# Patient Record
Sex: FEMALE | Race: WHITE | Employment: UNEMPLOYED | URBAN - METROPOLITAN AREA
[De-identification: names, ages, dates, MRNs, and addresses within clinical notes are randomized per-mention and may not be internally consistent; named-entity substitution may affect disease eponyms.]

---

## 2017-09-08 PROCEDURE — 81001 URINALYSIS AUTO W/SCOPE: CPT | Performed by: FAMILY MEDICINE

## 2017-09-08 PROCEDURE — 82607 VITAMIN B-12: CPT | Performed by: FAMILY MEDICINE

## 2017-09-08 PROCEDURE — 82746 ASSAY OF FOLIC ACID SERUM: CPT | Performed by: FAMILY MEDICINE

## 2017-09-21 PROCEDURE — 88175 CYTOPATH C/V AUTO FLUID REDO: CPT | Performed by: FAMILY MEDICINE

## 2018-06-18 PROCEDURE — 81001 URINALYSIS AUTO W/SCOPE: CPT | Performed by: FAMILY MEDICINE

## 2018-06-18 PROCEDURE — 82607 VITAMIN B-12: CPT | Performed by: FAMILY MEDICINE

## 2018-10-04 PROCEDURE — 81001 URINALYSIS AUTO W/SCOPE: CPT | Performed by: FAMILY MEDICINE

## 2018-10-04 PROCEDURE — 87086 URINE CULTURE/COLONY COUNT: CPT | Performed by: FAMILY MEDICINE

## 2018-11-14 PROCEDURE — 87086 URINE CULTURE/COLONY COUNT: CPT | Performed by: FAMILY MEDICINE

## 2018-11-14 PROCEDURE — 81001 URINALYSIS AUTO W/SCOPE: CPT | Performed by: FAMILY MEDICINE

## 2019-02-25 PROCEDURE — 83883 ASSAY NEPHELOMETRY NOT SPEC: CPT | Performed by: INTERNAL MEDICINE

## 2019-02-25 PROCEDURE — 86334 IMMUNOFIX E-PHORESIS SERUM: CPT | Performed by: INTERNAL MEDICINE

## 2019-02-25 PROCEDURE — 86200 CCP ANTIBODY: CPT | Performed by: INTERNAL MEDICINE

## 2019-02-25 PROCEDURE — 86431 RHEUMATOID FACTOR QUANT: CPT | Performed by: INTERNAL MEDICINE

## 2019-02-25 PROCEDURE — 86235 NUCLEAR ANTIGEN ANTIBODY: CPT | Performed by: INTERNAL MEDICINE

## 2019-02-25 PROCEDURE — 86038 ANTINUCLEAR ANTIBODIES: CPT | Performed by: INTERNAL MEDICINE

## 2019-02-25 PROCEDURE — 84165 PROTEIN E-PHORESIS SERUM: CPT | Performed by: INTERNAL MEDICINE

## 2019-03-26 NOTE — H&P (VIEW-ONLY)
[facility] New Patient Visit    Sameera Bray  [id number]  7/17/1960    Sylvie Plush    Chief Complaint and History of Present Illness:     Patient presents with:  Consult  Colonoscopy Screening: egd  Abdominal Pain    59yo F ID of abscesses        Current Outpatient Medications:  PANTOPRAZOLE SODIUM 40 MG Oral Tab EC TAKE 1 TABLET(40 MG) BY MOUTH DAILY BEFORE A MEAL Disp: 90 tablet Rfl: 0   Dicyclomine HCl 10 MG Oral Cap Take 1 capsule (10 mg total) by mouth 3 (three) times da no guarding/rebound  EXTREMITIES: no c/c/e  PSYCH: normal affect  NUT: well nourished appearing, no cachexia  Neuro: A&Ox3       LABS and STUDIES:     Lab Results   Component Value Date/Time    WBC 7.62 03/04/2019 06:16 PM    HGB 13.0 03/04/2019 06:16 PM GI ENDOSCOPY - REFERRAL  -     COLONOSCOPY DIAGNOSTIC - REFERRAL    Rectal bleeding  -     COLONOSCOPY DIAGNOSTIC - REFERRAL    Family history of colon cancer  -     COLONOSCOPY DIAGNOSTIC - REFERRAL    Dysphagia, unspecified type  -     UPPER GI ENDOSCOPY

## 2019-04-17 ENCOUNTER — ANESTHESIA EVENT (OUTPATIENT)
Dept: ENDOSCOPY | Facility: HOSPITAL | Age: 59
End: 2019-04-17
Payer: MEDICAID

## 2019-04-17 ENCOUNTER — HOSPITAL ENCOUNTER (OUTPATIENT)
Facility: HOSPITAL | Age: 59
Setting detail: HOSPITAL OUTPATIENT SURGERY
Discharge: HOME | End: 2019-04-17
Attending: INTERNAL MEDICINE | Admitting: INTERNAL MEDICINE
Payer: MEDICAID

## 2019-04-17 ENCOUNTER — ANESTHESIA (OUTPATIENT)
Dept: ENDOSCOPY | Facility: HOSPITAL | Age: 59
End: 2019-04-17
Payer: MEDICAID

## 2019-04-17 DIAGNOSIS — Z80.0 FAMILY HISTORY OF COLON CANCER: ICD-10-CM

## 2019-04-17 DIAGNOSIS — R10.9 ABDOMINAL PAIN, UNSPECIFIED ABDOMINAL LOCATION: ICD-10-CM

## 2019-04-17 DIAGNOSIS — K62.5 RECTAL BLEEDING: ICD-10-CM

## 2019-04-17 PROCEDURE — 0DB78ZX EXCISION OF STOMACH, PYLORUS, VIA NATURAL OR ARTIFICIAL OPENING ENDOSCOPIC, DIAGNOSTIC: ICD-10-PCS | Performed by: INTERNAL MEDICINE

## 2019-04-17 PROCEDURE — 0DB48ZX EXCISION OF ESOPHAGOGASTRIC JUNCTION, VIA NATURAL OR ARTIFICIAL OPENING ENDOSCOPIC, DIAGNOSTIC: ICD-10-PCS | Performed by: INTERNAL MEDICINE

## 2019-04-17 PROCEDURE — 0DB98ZX EXCISION OF DUODENUM, VIA NATURAL OR ARTIFICIAL OPENING ENDOSCOPIC, DIAGNOSTIC: ICD-10-PCS | Performed by: INTERNAL MEDICINE

## 2019-04-17 PROCEDURE — 0DB18ZX EXCISION OF UPPER ESOPHAGUS, VIA NATURAL OR ARTIFICIAL OPENING ENDOSCOPIC, DIAGNOSTIC: ICD-10-PCS | Performed by: INTERNAL MEDICINE

## 2019-04-17 PROCEDURE — 88312 SPECIAL STAINS GROUP 1: CPT | Performed by: INTERNAL MEDICINE

## 2019-04-17 PROCEDURE — 88305 TISSUE EXAM BY PATHOLOGIST: CPT | Performed by: INTERNAL MEDICINE

## 2019-04-17 PROCEDURE — 0DBE8ZX EXCISION OF LARGE INTESTINE, VIA NATURAL OR ARTIFICIAL OPENING ENDOSCOPIC, DIAGNOSTIC: ICD-10-PCS | Performed by: INTERNAL MEDICINE

## 2019-04-17 PROCEDURE — 0DB68ZX EXCISION OF STOMACH, VIA NATURAL OR ARTIFICIAL OPENING ENDOSCOPIC, DIAGNOSTIC: ICD-10-PCS | Performed by: INTERNAL MEDICINE

## 2019-04-17 RX ORDER — SODIUM CHLORIDE, SODIUM LACTATE, POTASSIUM CHLORIDE, CALCIUM CHLORIDE 600; 310; 30; 20 MG/100ML; MG/100ML; MG/100ML; MG/100ML
INJECTION, SOLUTION INTRAVENOUS CONTINUOUS
Status: DISCONTINUED | OUTPATIENT
Start: 2019-04-17 | End: 2019-04-17

## 2019-04-17 RX ORDER — GLYCOPYRROLATE 0.2 MG/ML
INJECTION, SOLUTION INTRAMUSCULAR; INTRAVENOUS AS NEEDED
Status: DISCONTINUED | OUTPATIENT
Start: 2019-04-17 | End: 2019-04-17 | Stop reason: SURG

## 2019-04-17 RX ORDER — MIDAZOLAM HYDROCHLORIDE 1 MG/ML
INJECTION INTRAMUSCULAR; INTRAVENOUS AS NEEDED
Status: DISCONTINUED | OUTPATIENT
Start: 2019-04-17 | End: 2019-04-17 | Stop reason: SURG

## 2019-04-17 RX ORDER — LIDOCAINE HYDROCHLORIDE 10 MG/ML
INJECTION, SOLUTION EPIDURAL; INFILTRATION; INTRACAUDAL; PERINEURAL AS NEEDED
Status: DISCONTINUED | OUTPATIENT
Start: 2019-04-17 | End: 2019-04-17 | Stop reason: SURG

## 2019-04-17 RX ADMIN — GLYCOPYRROLATE 0.2 MG: 0.2 INJECTION, SOLUTION INTRAMUSCULAR; INTRAVENOUS at 15:07:00

## 2019-04-17 RX ADMIN — LIDOCAINE HYDROCHLORIDE 50 MG: 10 INJECTION, SOLUTION EPIDURAL; INFILTRATION; INTRACAUDAL; PERINEURAL at 15:04:00

## 2019-04-17 RX ADMIN — SODIUM CHLORIDE, SODIUM LACTATE, POTASSIUM CHLORIDE, CALCIUM CHLORIDE: 600; 310; 30; 20 INJECTION, SOLUTION INTRAVENOUS at 15:02:00

## 2019-04-17 RX ADMIN — SODIUM CHLORIDE, SODIUM LACTATE, POTASSIUM CHLORIDE, CALCIUM CHLORIDE: 600; 310; 30; 20 INJECTION, SOLUTION INTRAVENOUS at 15:47:00

## 2019-04-17 RX ADMIN — MIDAZOLAM HYDROCHLORIDE 2 MG: 1 INJECTION INTRAMUSCULAR; INTRAVENOUS at 15:04:00

## 2019-04-17 NOTE — ANESTHESIA PREPROCEDURE EVALUATION
Anesthesia PreOp Note    HPI:     Rosi Paz is a 60year old female who presents for preoperative consultation requested by: Marito Flood MD    Date of Surgery: 4/17/2019    Procedure(s):  ESOPHAGOGASTRODUODENOSCOPY (EGD)  COLONOSCOPY  Indication: Re 4/10/2019   NAPROXEN 500 MG Oral Tab TAKE 1 TABLET(500 MG) BY MOUTH TWICE DAILY Disp: 180 tablet Rfl: 1 4/10/2019   PARoxetine HCl 40 MG Oral Tab Take 1 tablet (40 mg total) by mouth daily.  Disp: 90 tablet Rfl: 3 4/17/2019       Current Facility-Administer service: Not on file        Active member of club or organization: Not on file        Attends meetings of clubs or organizations: Not on file        Relationship status: Not on file      Intimate partner violence:        Fear of current or ex partner: Not plan, benefits, risks including possible dental damage if relevant, major complications, and any alternative forms of anesthetic management. All of the patient's questions were answered to the best of my ability.  The patient desires the anesthetic manage

## 2019-04-17 NOTE — INTERVAL H&P NOTE
Pre-op Diagnosis: Rectal bleeding , Abdominal pain, unspecified abdominal location , Family history of colon cancer    The above referenced H&P was reviewed by Filbert Kehr, MD on 4/17/2019, the patient was examined and no significant changes have occurred

## 2019-04-17 NOTE — OPERATIVE REPORT
EGD/Colonoscopy Operative Report    Pre-Operative Diagnosis:   Colonoscopy: Rectal bleeding , Abdominal pain, unspecified abdominal location , Family history of colon cancer, diarrhea  EGD: dysphagia     Post-Oper identified by landmarks, including the appendiceal orifice and ileoceccal valve. Careful examination of the entire colon was performed during withdrawal of the endoscope. The scope was withdrawn to the rectum and retroflexion was performed.   The patient to rule out EoE. No etiology for diarrhea was found and random colonic biopsies were taken to rule out microscopic colitis. Rectal bleeding likely due to hemorrhoids.      Recommendations:   -Await pathology results.  -High fiber diet, sitz baths, high navneet

## 2019-04-17 NOTE — ANESTHESIA POSTPROCEDURE EVALUATION
Patient: Christian Wagner    Procedure Summary     Date:  04/17/19 Room / Location:  [facility] ENDOSCOPY 05 / [facility]COPY    Anesthesia Start:  9013 Anesthesia Stop:      Procedures:       ESOPHAGOGASTRODUODENOSCOPY (EGD) (N/A )      COLONOSCOPY (N/A ) Diagnosis:

## 2019-04-18 VITALS
DIASTOLIC BLOOD PRESSURE: 78 MMHG | BODY MASS INDEX: 45.27 KG/M2 | HEIGHT: 62 IN | RESPIRATION RATE: 14 BRPM | SYSTOLIC BLOOD PRESSURE: 125 MMHG | WEIGHT: 246 LBS | HEART RATE: 96 BPM | OXYGEN SATURATION: 99 %

## 2019-04-23 NOTE — PROGRESS NOTES
Spoke to patient over the phone regarding biopsy results. 1) Upper abdominal pain likely due to NSAIDs related gastritis and erosions. Recommended that she discontinue NSAIDs and increase PPI to BID.    2) Esophageal biopsies notable for negative EoE and

## 2019-07-22 PROBLEM — K58.0 IRRITABLE BOWEL SYNDROME WITH DIARRHEA: Status: ACTIVE | Noted: 2019-07-22

## 2019-07-22 PROBLEM — K21.00 GASTROESOPHAGEAL REFLUX DISEASE WITH ESOPHAGITIS: Status: ACTIVE | Noted: 2019-07-22

## 2019-08-19 ENCOUNTER — LAB ENCOUNTER (OUTPATIENT)
Dept: LAB | Age: 59
End: 2019-08-19
Payer: MEDICAID

## 2019-08-19 DIAGNOSIS — M35.00 SJOGREN'S SYNDROME WITHOUT EXTRAGLANDULAR INVOLVEMENT (HCC): Primary | ICD-10-CM

## 2019-08-19 LAB
BASOPHILS # BLD AUTO: 0.02 X10(3) UL (ref 0–0.2)
BASOPHILS NFR BLD AUTO: 0.4 %
DEPRECATED RDW RBC AUTO: 47.7 FL (ref 35.1–46.3)
EOSINOPHIL # BLD AUTO: 0.13 X10(3) UL (ref 0–0.7)
EOSINOPHIL NFR BLD AUTO: 2.5 %
ERYTHROCYTE [DISTWIDTH] IN BLOOD BY AUTOMATED COUNT: 14.4 % (ref 11–15)
HCT VFR BLD AUTO: 42.3 % (ref 35–48)
HGB BLD-MCNC: 13 G/DL (ref 12–16)
IMM GRANULOCYTES # BLD AUTO: 0.01 X10(3) UL (ref 0–1)
IMM GRANULOCYTES NFR BLD: 0.2 %
LYMPHOCYTES # BLD AUTO: 1.47 X10(3) UL (ref 1–4)
LYMPHOCYTES NFR BLD AUTO: 28.1 %
MCH RBC QN AUTO: 27.8 PG (ref 26–34)
MCHC RBC AUTO-ENTMCNC: 30.7 G/DL (ref 31–37)
MCV RBC AUTO: 90.6 FL (ref 80–100)
MONOCYTES # BLD AUTO: 0.39 X10(3) UL (ref 0.1–1)
MONOCYTES NFR BLD AUTO: 7.5 %
NEUTROPHILS # BLD AUTO: 3.21 X10 (3) UL (ref 1.5–7.7)
NEUTROPHILS # BLD AUTO: 3.21 X10(3) UL (ref 1.5–7.7)
NEUTROPHILS NFR BLD AUTO: 61.3 %
PLATELET # BLD AUTO: 305 10(3)UL (ref 150–450)
RBC # BLD AUTO: 4.67 X10(6)UL (ref 3.8–5.3)
WBC # BLD AUTO: 5.2 X10(3) UL (ref 4–11)

## 2019-08-19 PROCEDURE — 87086 URINE CULTURE/COLONY COUNT: CPT | Performed by: FAMILY MEDICINE

## 2019-08-19 PROCEDURE — 85025 COMPLETE CBC W/AUTO DIFF WBC: CPT

## 2019-08-19 PROCEDURE — 81003 URINALYSIS AUTO W/O SCOPE: CPT | Performed by: FAMILY MEDICINE

## 2019-09-24 ENCOUNTER — LAB ENCOUNTER (OUTPATIENT)
Dept: LAB | Age: 59
End: 2019-09-24
Payer: MEDICAID

## 2019-09-24 DIAGNOSIS — G04.90 INFLAMMATORY DISEASE OF THE CENTRAL NERVOUS SYSTEM: Primary | ICD-10-CM

## 2020-03-27 ENCOUNTER — IMAGING SERVICES (OUTPATIENT)
Dept: OTHER | Age: 60
End: 2020-03-27

## 2020-04-20 ENCOUNTER — IMAGING SERVICES (OUTPATIENT)
Dept: OTHER | Age: 60
End: 2020-04-20

## 2020-04-20 PROCEDURE — 88341 IMHCHEM/IMCYTCHM EA ADD ANTB: CPT | Performed by: RADIOLOGY

## 2020-04-20 PROCEDURE — 88305 TISSUE EXAM BY PATHOLOGIST: CPT | Performed by: RADIOLOGY

## 2020-04-20 PROCEDURE — 88342 IMHCHEM/IMCYTCHM 1ST ANTB: CPT | Performed by: RADIOLOGY

## 2020-05-05 RX ORDER — ACETAMINOPHEN 500 MG
1000 TABLET ORAL 2 TIMES DAILY PRN
COMMUNITY

## 2020-05-05 RX ORDER — OXCARBAZEPINE 150 MG/1
150 TABLET, FILM COATED ORAL 2 TIMES DAILY
COMMUNITY

## 2020-05-05 RX ORDER — HYDROCODONE BITARTRATE AND ACETAMINOPHEN 5; 325 MG/1; MG/1
1 TABLET ORAL EVERY 6 HOURS PRN
COMMUNITY

## 2020-05-05 SDOH — HEALTH STABILITY: MENTAL HEALTH: HOW OFTEN DO YOU HAVE A DRINK CONTAINING ALCOHOL?: NEVER

## 2020-05-05 ASSESSMENT — ACTIVITIES OF DAILY LIVING (ADL)
ADL_BEFORE_ADMISSION: INDEPENDENT
ADL_SCORE: 12

## 2020-05-08 ENCOUNTER — LAB SERVICES (OUTPATIENT)
Dept: LAB | Age: 60
End: 2020-05-08

## 2020-05-08 ENCOUNTER — HOSPITAL ENCOUNTER (OUTPATIENT)
Dept: LAB | Age: 60
Discharge: HOME | End: 2020-05-08

## 2020-05-08 DIAGNOSIS — Z01.812 PRE-PROCEDURAL LABORATORY EXAMINATION: ICD-10-CM

## 2020-05-08 DIAGNOSIS — Z01.812 PRE-PROCEDURAL LABORATORY EXAMINATION: Primary | ICD-10-CM

## 2020-05-08 DIAGNOSIS — N64.89 RADIAL SCAR OF BREAST: Primary | ICD-10-CM

## 2020-05-08 DIAGNOSIS — N64.89 RADIAL SCAR OF BREAST: ICD-10-CM

## 2020-05-08 LAB
ATRIAL RATE (BPM): 75
P AXIS (DEGREES): -7
PR-INTERVAL (MSEC): 144
QRS-INTERVAL (MSEC): 74
QT-INTERVAL (MSEC): 426
QTC: 476
R AXIS (DEGREES): 16
REPORT TEXT: NORMAL
SARS-COV-2 RNA SPEC QL NAA+PROBE: NOT DETECTED
SERVICE CMNT-IMP: NORMAL
SPECIMEN SOURCE: NORMAL
T AXIS (DEGREES): 62
VENTRICULAR RATE EKG/MIN (BPM): 75

## 2020-05-08 PROCEDURE — 87635 SARS-COV-2 COVID-19 AMP PRB: CPT | Performed by: FAMILY MEDICINE

## 2020-05-08 PROCEDURE — 93005 ELECTROCARDIOGRAM TRACING: CPT

## 2020-05-11 ENCOUNTER — HOSPITAL ENCOUNTER (OUTPATIENT)
Dept: MAMMOGRAPHY | Age: 60
Discharge: HOME | End: 2020-05-11
Attending: SURGERY | Admitting: SURGERY

## 2020-05-11 ENCOUNTER — ANESTHESIA EVENT (OUTPATIENT)
Dept: SURGERY | Age: 60
End: 2020-05-11

## 2020-05-11 ENCOUNTER — ANESTHESIA (OUTPATIENT)
Dept: SURGERY | Age: 60
End: 2020-05-11

## 2020-05-11 ENCOUNTER — HOSPITAL ENCOUNTER (OUTPATIENT)
Age: 60
Discharge: HOME | End: 2020-05-11
Attending: SURGERY | Admitting: SURGERY

## 2020-05-11 ENCOUNTER — OFF PREMISE (OUTPATIENT)
Dept: SURGERY | Age: 60
End: 2020-05-11

## 2020-05-11 ENCOUNTER — HOSPITAL ENCOUNTER (OUTPATIENT)
Dept: MAMMOGRAPHY | Age: 60
Discharge: HOME | End: 2020-05-11
Attending: SURGERY

## 2020-05-11 DIAGNOSIS — N64.89 RADIAL SCAR OF BREAST: ICD-10-CM

## 2020-05-11 PROCEDURE — 10006023 HB SUPPLY 272: Performed by: SURGERY

## 2020-05-11 PROCEDURE — 88307 TISSUE EXAM BY PATHOLOGIST: CPT

## 2020-05-11 PROCEDURE — 10002800 HB RX 250 W HCPCS: Performed by: ANESTHESIOLOGY

## 2020-05-11 PROCEDURE — 13000034 HB BASIC CASE  S/U +1ST 15 MIN: Performed by: SURGERY

## 2020-05-11 PROCEDURE — 10002807 HB RX 258: Performed by: SURGERY

## 2020-05-11 PROCEDURE — 13000003 HB ANESTHESIA  GENERAL EA ADD MINUTE: Performed by: SURGERY

## 2020-05-11 PROCEDURE — 76098 X-RAY EXAM SURGICAL SPECIMEN: CPT

## 2020-05-11 PROCEDURE — 10002801 HB RX 250 W/O HCPCS: Performed by: SURGERY

## 2020-05-11 PROCEDURE — 19281 PERQ DEVICE BREAST 1ST IMAG: CPT

## 2020-05-11 PROCEDURE — 13000001 HB PHASE II RECOVERY EA 30 MINUTES: Performed by: SURGERY

## 2020-05-11 PROCEDURE — 10002800 HB RX 250 W HCPCS: Performed by: SURGERY

## 2020-05-11 PROCEDURE — 13000002 HB ANESTHESIA  GENERAL  S/U + 1ST 15 MIN: Performed by: SURGERY

## 2020-05-11 PROCEDURE — 10002801 HB RX 250 W/O HCPCS: Performed by: RADIOLOGY

## 2020-05-11 PROCEDURE — 10004451 HB PACU RECOVERY 1ST 30 MINUTES: Performed by: SURGERY

## 2020-05-11 PROCEDURE — 13000035 HB BASIC CASE EA ADD MINUTE: Performed by: SURGERY

## 2020-05-11 PROCEDURE — 10002803 HB RX 637

## 2020-05-11 PROCEDURE — 10004452 HB PACU ADDL 30 MINUTES: Performed by: SURGERY

## 2020-05-11 RX ORDER — ONDANSETRON 4 MG/1
4 TABLET, ORALLY DISINTEGRATING ORAL EVERY 12 HOURS PRN
Status: DISCONTINUED | OUTPATIENT
Start: 2020-05-11 | End: 2020-05-11 | Stop reason: HOSPADM

## 2020-05-11 RX ORDER — FAMOTIDINE 20 MG/1
TABLET, FILM COATED ORAL
Status: DISCONTINUED
Start: 2020-05-11 | End: 2020-05-11 | Stop reason: HOSPADM

## 2020-05-11 RX ORDER — MIDAZOLAM HYDROCHLORIDE 1 MG/ML
INJECTION, SOLUTION INTRAMUSCULAR; INTRAVENOUS PRN
Status: DISCONTINUED | OUTPATIENT
Start: 2020-05-11 | End: 2020-05-11

## 2020-05-11 RX ORDER — FAMOTIDINE 20 MG/1
20 TABLET, FILM COATED ORAL ONCE
Status: DISCONTINUED | OUTPATIENT
Start: 2020-05-11 | End: 2020-05-11 | Stop reason: HOSPADM

## 2020-05-11 RX ORDER — ACETAMINOPHEN 500 MG
1000 TABLET ORAL EVERY 8 HOURS SCHEDULED
Status: DISCONTINUED | OUTPATIENT
Start: 2020-05-11 | End: 2020-05-11 | Stop reason: HOSPADM

## 2020-05-11 RX ORDER — 0.9 % SODIUM CHLORIDE 0.9 %
2 VIAL (ML) INJECTION EVERY 12 HOURS SCHEDULED
Status: DISCONTINUED | OUTPATIENT
Start: 2020-05-11 | End: 2020-05-11 | Stop reason: HOSPADM

## 2020-05-11 RX ORDER — GABAPENTIN 300 MG/1
600 CAPSULE ORAL
Status: COMPLETED | OUTPATIENT
Start: 2020-05-11 | End: 2020-05-11

## 2020-05-11 RX ORDER — HYDROCODONE BITARTRATE AND ACETAMINOPHEN 5; 325 MG/1; MG/1
1 TABLET ORAL EVERY 4 HOURS PRN
Qty: 20 TABLET | Refills: 0 | Status: SHIPPED | OUTPATIENT
Start: 2020-05-11

## 2020-05-11 RX ORDER — METOCLOPRAMIDE HYDROCHLORIDE 5 MG/ML
10 INJECTION INTRAMUSCULAR; INTRAVENOUS EVERY 6 HOURS PRN
Status: DISCONTINUED | OUTPATIENT
Start: 2020-05-11 | End: 2020-05-11 | Stop reason: HOSPADM

## 2020-05-11 RX ORDER — SODIUM CHLORIDE, SODIUM LACTATE, POTASSIUM CHLORIDE, CALCIUM CHLORIDE 600; 310; 30; 20 MG/100ML; MG/100ML; MG/100ML; MG/100ML
INJECTION, SOLUTION INTRAVENOUS
Status: DISCONTINUED
Start: 2020-05-11 | End: 2020-05-11 | Stop reason: HOSPADM

## 2020-05-11 RX ORDER — CHLORHEXIDINE GLUCONATE ORAL RINSE 1.2 MG/ML
15 SOLUTION DENTAL
Status: COMPLETED | OUTPATIENT
Start: 2020-05-11 | End: 2020-05-11

## 2020-05-11 RX ORDER — SODIUM CHLORIDE, SODIUM LACTATE, POTASSIUM CHLORIDE, CALCIUM CHLORIDE 600; 310; 30; 20 MG/100ML; MG/100ML; MG/100ML; MG/100ML
10 INJECTION, SOLUTION INTRAVENOUS CONTINUOUS
Status: DISCONTINUED | OUTPATIENT
Start: 2020-05-11 | End: 2020-05-11 | Stop reason: HOSPADM

## 2020-05-11 RX ORDER — METOCLOPRAMIDE 10 MG/1
10 TABLET ORAL EVERY 6 HOURS PRN
Status: DISCONTINUED | OUTPATIENT
Start: 2020-05-11 | End: 2020-05-11 | Stop reason: HOSPADM

## 2020-05-11 RX ORDER — SODIUM CHLORIDE, SODIUM LACTATE, POTASSIUM CHLORIDE, CALCIUM CHLORIDE 600; 310; 30; 20 MG/100ML; MG/100ML; MG/100ML; MG/100ML
INJECTION, SOLUTION INTRAVENOUS CONTINUOUS
Status: DISCONTINUED | OUTPATIENT
Start: 2020-05-11 | End: 2020-05-11 | Stop reason: HOSPADM

## 2020-05-11 RX ORDER — BUPIVACAINE HYDROCHLORIDE 5 MG/ML
INJECTION, SOLUTION EPIDURAL; INTRACAUDAL PRN
Status: DISCONTINUED | OUTPATIENT
Start: 2020-05-11 | End: 2020-05-11 | Stop reason: HOSPADM

## 2020-05-11 RX ORDER — ONDANSETRON 2 MG/ML
4 INJECTION INTRAMUSCULAR; INTRAVENOUS EVERY 12 HOURS PRN
Status: DISCONTINUED | OUTPATIENT
Start: 2020-05-11 | End: 2020-05-11 | Stop reason: HOSPADM

## 2020-05-11 RX ORDER — HYDROCODONE BITARTRATE AND ACETAMINOPHEN 5; 325 MG/1; MG/1
1 TABLET ORAL
Status: COMPLETED | OUTPATIENT
Start: 2020-05-11 | End: 2020-05-11

## 2020-05-11 RX ORDER — PAROXETINE HYDROCHLORIDE 40 MG/1
40 TABLET, FILM COATED ORAL EVERY MORNING
COMMUNITY

## 2020-05-11 RX ORDER — ACETAMINOPHEN 500 MG
1000 TABLET ORAL
Status: DISCONTINUED | OUTPATIENT
Start: 2020-05-11 | End: 2020-05-11 | Stop reason: HOSPADM

## 2020-05-11 RX ORDER — CHLORHEXIDINE GLUCONATE ORAL RINSE 1.2 MG/ML
SOLUTION DENTAL
Status: COMPLETED
Start: 2020-05-11 | End: 2020-05-11

## 2020-05-11 RX ORDER — GABAPENTIN 300 MG/1
300 CAPSULE ORAL EVERY 8 HOURS SCHEDULED
Status: DISCONTINUED | OUTPATIENT
Start: 2020-05-11 | End: 2020-05-11 | Stop reason: HOSPADM

## 2020-05-11 RX ORDER — ONDANSETRON 2 MG/ML
4 INJECTION INTRAMUSCULAR; INTRAVENOUS
Status: DISCONTINUED | OUTPATIENT
Start: 2020-05-11 | End: 2020-05-11 | Stop reason: HOSPADM

## 2020-05-11 RX ORDER — SODIUM CHLORIDE, SODIUM LACTATE, POTASSIUM CHLORIDE, CALCIUM CHLORIDE 600; 310; 30; 20 MG/100ML; MG/100ML; MG/100ML; MG/100ML
INJECTION, SOLUTION INTRAVENOUS
Status: COMPLETED
Start: 2020-05-11 | End: 2020-05-11

## 2020-05-11 RX ORDER — PROPOFOL 10 MG/ML
INJECTION, EMULSION INTRAVENOUS PRN
Status: DISCONTINUED | OUTPATIENT
Start: 2020-05-11 | End: 2020-05-11

## 2020-05-11 RX ORDER — SODIUM CHLORIDE, SODIUM LACTATE, POTASSIUM CHLORIDE, CALCIUM CHLORIDE 600; 310; 30; 20 MG/100ML; MG/100ML; MG/100ML; MG/100ML
10 INJECTION, SOLUTION INTRAVENOUS CONTINUOUS
Status: CANCELLED | OUTPATIENT
Start: 2020-05-11

## 2020-05-11 RX ORDER — FAMOTIDINE 20 MG/1
20 TABLET, FILM COATED ORAL ONCE
Status: CANCELLED | OUTPATIENT
Start: 2020-05-11 | End: 2020-05-11

## 2020-05-11 RX ORDER — GABAPENTIN 300 MG/1
CAPSULE ORAL
Status: COMPLETED
Start: 2020-05-11 | End: 2020-05-11

## 2020-05-11 RX ORDER — LIDOCAINE HYDROCHLORIDE 20 MG/ML
INJECTION, SOLUTION INFILTRATION; PERINEURAL PRN
Status: COMPLETED | OUTPATIENT
Start: 2020-05-11 | End: 2020-05-11

## 2020-05-11 RX ORDER — TRAMADOL HYDROCHLORIDE 50 MG/1
25 TABLET ORAL EVERY 4 HOURS PRN
Status: DISCONTINUED | OUTPATIENT
Start: 2020-05-11 | End: 2020-05-11 | Stop reason: HOSPADM

## 2020-05-11 RX ORDER — HYDROCODONE BITARTRATE AND ACETAMINOPHEN 5; 325 MG/1; MG/1
TABLET ORAL
Status: COMPLETED
Start: 2020-05-11 | End: 2020-05-11

## 2020-05-11 RX ADMIN — HYDROCODONE BITARTRATE AND ACETAMINOPHEN 1 TABLET: 5; 325 TABLET ORAL at 14:55

## 2020-05-11 RX ADMIN — CEFAZOLIN SODIUM 2000 MG: 300 INJECTION, POWDER, LYOPHILIZED, FOR SOLUTION INTRAVENOUS at 11:47

## 2020-05-11 RX ADMIN — FENTANYL CITRATE 50 MCG: 50 INJECTION INTRAMUSCULAR; INTRAVENOUS at 13:10

## 2020-05-11 RX ADMIN — FENTANYL CITRATE 100 MCG: 50 INJECTION INTRAMUSCULAR; INTRAVENOUS at 11:42

## 2020-05-11 RX ADMIN — SODIUM CHLORIDE, POTASSIUM CHLORIDE, SODIUM LACTATE AND CALCIUM CHLORIDE: 600; 310; 30; 20 INJECTION, SOLUTION INTRAVENOUS at 11:40

## 2020-05-11 RX ADMIN — SODIUM CHLORIDE, POTASSIUM CHLORIDE, SODIUM LACTATE AND CALCIUM CHLORIDE: 600; 310; 30; 20 INJECTION, SOLUTION INTRAVENOUS at 13:19

## 2020-05-11 RX ADMIN — MIDAZOLAM HYDROCHLORIDE 2 MG: 1 INJECTION, SOLUTION INTRAMUSCULAR; INTRAVENOUS at 11:42

## 2020-05-11 RX ADMIN — CHLORHEXIDINE GLUCONATE ORAL RINSE 15 ML: 1.2 SOLUTION DENTAL at 10:37

## 2020-05-11 RX ADMIN — PROPOFOL 200 MG: 10 INJECTION, EMULSION INTRAVENOUS at 11:45

## 2020-05-11 RX ADMIN — GABAPENTIN 600 MG: 300 CAPSULE ORAL at 10:36

## 2020-05-11 RX ADMIN — FENTANYL CITRATE 50 MCG: 50 INJECTION INTRAMUSCULAR; INTRAVENOUS at 12:55

## 2020-05-11 RX ADMIN — LIDOCAINE HYDROCHLORIDE 9 ML: 20 INJECTION, SOLUTION INFILTRATION; PERINEURAL at 10:08

## 2020-05-11 RX ADMIN — CHLORHEXIDINE GLUCONATE 15 ML: 1.2 RINSE ORAL at 10:37

## 2020-05-11 RX ADMIN — HYDROMORPHONE HYDROCHLORIDE 0.2 MG: 1 INJECTION, SOLUTION INTRAMUSCULAR; INTRAVENOUS; SUBCUTANEOUS at 13:23

## 2020-05-11 SDOH — HEALTH STABILITY: MENTAL HEALTH: HOW OFTEN DO YOU HAVE A DRINK CONTAINING ALCOHOL?: NEVER

## 2020-05-11 ASSESSMENT — PAIN SCALES - GENERAL
PAINLEVEL_OUTOF10: 4
PAINLEVEL_OUTOF10: 4
PAINLEVEL_OUTOF10: 7
PAINLEVEL_OUTOF10: 0
PAINLEVEL_OUTOF10: 6
PAINLEVEL_OUTOF10: 6
PAINLEVEL_OUTOF10: 2
PAINLEVEL_OUTOF10: 2
PAINLEVEL_OUTOF10: 7

## 2020-05-12 ENCOUNTER — ADVANCED DIRECTIVES (OUTPATIENT)
Dept: HEALTH INFORMATION MANAGEMENT | Age: 60
End: 2020-05-12

## 2020-05-14 LAB — PATHOLOGIST NAME: NORMAL

## 2020-08-06 ENCOUNTER — APPOINTMENT (OUTPATIENT)
Dept: GENERAL RADIOLOGY | Facility: HOSPITAL | Age: 60
End: 2020-08-06
Attending: EMERGENCY MEDICINE
Payer: MEDICAID

## 2020-08-06 ENCOUNTER — HOSPITAL ENCOUNTER (EMERGENCY)
Facility: HOSPITAL | Age: 60
Discharge: HOME | End: 2020-08-06
Attending: EMERGENCY MEDICINE
Payer: MEDICAID

## 2020-08-06 ENCOUNTER — APPOINTMENT (OUTPATIENT)
Dept: ULTRASOUND IMAGING | Facility: HOSPITAL | Age: 60
End: 2020-08-06
Attending: EMERGENCY MEDICINE
Payer: MEDICAID

## 2020-08-06 VITALS
HEIGHT: 63 IN | DIASTOLIC BLOOD PRESSURE: 78 MMHG | BODY MASS INDEX: 44.3 KG/M2 | WEIGHT: 250 LBS | RESPIRATION RATE: 18 BRPM | OXYGEN SATURATION: 96 % | TEMPERATURE: 98 F | HEART RATE: 88 BPM | SYSTOLIC BLOOD PRESSURE: 128 MMHG

## 2020-08-06 DIAGNOSIS — L03.116 CELLULITIS OF LEFT LOWER EXTREMITY: Primary | ICD-10-CM

## 2020-08-06 LAB
ANION GAP SERPL CALC-SCNC: 6 MMOL/L (ref 0–18)
BASOPHILS # BLD AUTO: 0.02 X10(3) UL (ref 0–0.2)
BASOPHILS NFR BLD AUTO: 0.3 %
BUN BLD-MCNC: 22 MG/DL (ref 7–18)
BUN/CREAT SERPL: 27.2 (ref 10–20)
CALCIUM BLD-MCNC: 9.1 MG/DL (ref 8.5–10.1)
CHLORIDE SERPL-SCNC: 107 MMOL/L (ref 98–112)
CO2 SERPL-SCNC: 30 MMOL/L (ref 21–32)
CREAT BLD-MCNC: 0.81 MG/DL (ref 0.55–1.02)
DEPRECATED RDW RBC AUTO: 43.5 FL (ref 35.1–46.3)
EOSINOPHIL # BLD AUTO: 0.1 X10(3) UL (ref 0–0.7)
EOSINOPHIL NFR BLD AUTO: 1.7 %
ERYTHROCYTE [DISTWIDTH] IN BLOOD BY AUTOMATED COUNT: 14.1 % (ref 11–15)
GLUCOSE BLD-MCNC: 86 MG/DL (ref 70–99)
HCT VFR BLD AUTO: 36.7 % (ref 35–48)
HGB BLD-MCNC: 11.8 G/DL (ref 12–16)
IMM GRANULOCYTES # BLD AUTO: 0.02 X10(3) UL (ref 0–1)
IMM GRANULOCYTES NFR BLD: 0.3 %
LYMPHOCYTES # BLD AUTO: 1.78 X10(3) UL (ref 1–4)
LYMPHOCYTES NFR BLD AUTO: 29.7 %
MCH RBC QN AUTO: 27.6 PG (ref 26–34)
MCHC RBC AUTO-ENTMCNC: 32.2 G/DL (ref 31–37)
MCV RBC AUTO: 85.7 FL (ref 80–100)
MONOCYTES # BLD AUTO: 0.43 X10(3) UL (ref 0.1–1)
MONOCYTES NFR BLD AUTO: 7.2 %
NEUTROPHILS # BLD AUTO: 3.65 X10 (3) UL (ref 1.5–7.7)
NEUTROPHILS # BLD AUTO: 3.65 X10(3) UL (ref 1.5–7.7)
NEUTROPHILS NFR BLD AUTO: 60.8 %
OSMOLALITY SERPL CALC.SUM OF ELEC: 299 MOSM/KG (ref 275–295)
PLATELET # BLD AUTO: 281 10(3)UL (ref 150–450)
POTASSIUM SERPL-SCNC: 4.3 MMOL/L (ref 3.5–5.1)
RBC # BLD AUTO: 4.28 X10(6)UL (ref 3.8–5.3)
SODIUM SERPL-SCNC: 143 MMOL/L (ref 136–145)
WBC # BLD AUTO: 6 X10(3) UL (ref 4–11)

## 2020-08-06 PROCEDURE — 96365 THER/PROPH/DIAG IV INF INIT: CPT

## 2020-08-06 PROCEDURE — 73560 X-RAY EXAM OF KNEE 1 OR 2: CPT | Performed by: EMERGENCY MEDICINE

## 2020-08-06 PROCEDURE — 93971 EXTREMITY STUDY: CPT | Performed by: EMERGENCY MEDICINE

## 2020-08-06 PROCEDURE — 80048 BASIC METABOLIC PNL TOTAL CA: CPT | Performed by: EMERGENCY MEDICINE

## 2020-08-06 PROCEDURE — 85025 COMPLETE CBC W/AUTO DIFF WBC: CPT | Performed by: EMERGENCY MEDICINE

## 2020-08-06 PROCEDURE — 99284 EMERGENCY DEPT VISIT MOD MDM: CPT

## 2020-08-06 RX ORDER — CEPHALEXIN 500 MG/1
500 CAPSULE ORAL 4 TIMES DAILY
Qty: 40 CAPSULE | Refills: 0 | Status: SHIPPED | OUTPATIENT
Start: 2020-08-06 | End: 2020-08-10

## 2020-08-06 NOTE — ED INITIAL ASSESSMENT (HPI)
Patient presents to ER with c/o redness/pain to left calf over the last couple days.   Has factor V.

## 2020-08-06 NOTE — ED PROVIDER NOTES
Patient Seen in: [facility] AND [facility] Emergency Department      History   Patient presents with:  Deep Vein Thrombosis  Cellulitis    Stated Complaint: DVT vs cellulitis left leg    HPI    71-year-old female with history of Sjogren's disease with CNS involv Systems    Positive for stated complaint: DVT vs cellulitis left leg  Other systems are as noted in HPI. Constitutional and vital signs reviewed. All other systems reviewed and negative except as noted above.     Physical Exam     ED Triage Vitals [08 WITH DIFFERENTIAL WITH PLATELET.   Procedure                               Abnormality         Status                     ---------                               -----------         ------                     CBC W/ DIFFERENTIAL[[id number]]          Abnormal

## 2021-05-26 VITALS
HEIGHT: 62 IN | BODY MASS INDEX: 47.71 KG/M2 | OXYGEN SATURATION: 95 % | DIASTOLIC BLOOD PRESSURE: 77 MMHG | TEMPERATURE: 98.1 F | HEART RATE: 74 BPM | SYSTOLIC BLOOD PRESSURE: 139 MMHG | RESPIRATION RATE: 18 BRPM | WEIGHT: 259.26 LBS

## 2021-12-06 PROBLEM — E66.01 MORBID OBESITY WITH BODY MASS INDEX (BMI) OF 40.0 TO 49.9 (HCC): Status: ACTIVE | Noted: 2021-12-06

## 2021-12-14 PROBLEM — Z79.899 IMMUNOSUPPRESSION DUE TO DRUG THERAPY  (HCC): Status: ACTIVE | Noted: 2021-12-14

## 2021-12-14 PROBLEM — D84.821 IMMUNOSUPPRESSION DUE TO DRUG THERAPY (HCC): Status: ACTIVE | Noted: 2021-12-14

## 2021-12-14 PROBLEM — Z79.899 IMMUNOSUPPRESSION DUE TO DRUG THERAPY (HCC): Status: ACTIVE | Noted: 2021-12-14

## 2021-12-14 PROBLEM — D84.821 IMMUNOSUPPRESSION DUE TO DRUG THERAPY  (HCC): Status: ACTIVE | Noted: 2021-12-14

## 2021-12-20 PROBLEM — F41.9 ANXIETY DISORDER: Status: ACTIVE | Noted: 2021-12-20

## 2021-12-20 PROBLEM — G95.9 MYELOPATHY (HCC): Status: ACTIVE | Noted: 2021-12-20

## 2021-12-20 PROBLEM — F32.9 CURRENT EPISODE OF MAJOR DEPRESSIVE DISORDER WITHOUT PRIOR EPISODE: Status: ACTIVE | Noted: 2021-12-20

## 2021-12-21 PROBLEM — I50.32 CHRONIC HEART FAILURE WITH PRESERVED EJECTION FRACTION (HCC): Status: ACTIVE | Noted: 2021-12-21

## 2022-04-02 PROBLEM — E11.9 CONTROLLED TYPE 2 DIABETES MELLITUS WITHOUT COMPLICATION, WITHOUT LONG-TERM CURRENT USE OF INSULIN (HCC): Status: ACTIVE | Noted: 2022-04-02

## 2023-12-28 RX ORDER — BENZONATATE 100 MG/1
100 CAPSULE ORAL AS NEEDED
COMMUNITY

## 2023-12-28 RX ORDER — LEVOFLOXACIN 500 MG/1
500 TABLET, FILM COATED ORAL DAILY
COMMUNITY
End: 2024-01-10 | Stop reason: ALTCHOICE

## 2023-12-28 RX ORDER — DULOXETIN HYDROCHLORIDE 30 MG/1
30 CAPSULE, DELAYED RELEASE ORAL DAILY
COMMUNITY

## 2023-12-28 NOTE — H&P (VIEW-ONLY)
Pia Guerra is a 63 year old female who presents for a pre-operative physical exam.       HPI:   Patient scheduled for cataract surgery,   States she is recovering from pneumonia but continues to have cough  Trying to stay active, occasional HA   vision ok   no chest pain   No other complaints.    Current Outpatient Medications   Medication Sig Dispense Refill   • levoFLOXacin (LEVAQUIN) 500 MG Oral Tab Take 1 tablet (500 mg total) by mouth daily for 10 days. 10 tablet 0   • DULoxetine (CYMBALTA) 30 MG Oral Cap DR Particles Take 1 capsule (30 mg total) by mouth daily. 90 capsule 3   • HYDROcodone-acetaminophen 5-325 MG Oral Tab Take 1 tablet by mouth every 6 (six) hours as needed for Pain. 120 tablet 0   • gabapentin 100 MG Oral Cap Take 1 capsule (100 mg total) by mouth nightly. 90 capsule 1   • PAROXETINE HCL 40 MG Oral Tab TAKE 1 TABLET(40 MG) BY MOUTH EVERY MORNING 90 tablet 1   • ROSUVASTATIN 10 MG Oral Tab TAKE 1 TABLET(10 MG) BY MOUTH EVERY NIGHT 90 tablet 1   • PANTOPRAZOLE 40 MG Oral Tab EC TAKE 1 TABLET(40 MG) BY MOUTH EVERY MORNING BEFORE BREAKFAST 90 tablet 1   • albuterol 108 (90 Base) MCG/ACT Inhalation Aero Soln Inhale 2 puffs into the lungs every 6 (six) hours as needed. 1 each 0   • prednisoLONE 1 % Ophthalmic Suspension Start one drop, 4 times a day to the eye having cataract surgery.  Start after the surgery. 5 mL 1   • cycloSPORINE 0.05 % Ophthalmic Emulsion 1 drop ou b.i.d. 180 each 3   • diclofenac 1 % External Gel Apply 2 g topically 4 (four) times daily. 350 g 3   • ERGOCALCIFEROL 1.25 MG (79737 UT) Oral Cap TAKE 1 CAPSULE BY MOUTH EVERY 7 DAYS 12 capsule 3   • albuterol 108 (90 Base) MCG/ACT Inhalation Aero Soln Inhale 2 puffs into the lungs every 6 (six) hours as needed for Wheezing. 1 each 2   • Acetaminophen (TYLENOL ARTHRITIS EXT RELIEF OR) Take 2 tablets by mouth 2 (two) times daily.        Allergies:   Aspirin                 OTHER (SEE COMMENTS)    Comment:Pt stated that Aspirin  upset stomach  Cephalexin              HIVES  Morphine And Relate*    NAUSEA AND VOMITING  Naproxen                OTHER (SEE COMMENTS)    Comment:Pt states she has erosion in her stomach, cannot             take this med.  Oxycodone               NAUSEA AND VOMITING  Lactose                 PAIN, DIARRHEA  Zithromax [Azithrom*        Comment:Elevates her blood pressure   Past Medical History:   Diagnosis Date   • Anxiety state    • Asthma    • Bell's palsy    • Blood disorder     Factor 5 gene   • Depression    • Esophageal reflux    • Factor 5 Leiden mutation, heterozygous (HCC)    • Family history of colon cancer    • Fibromyalgia    • Herniated nucleus pulposus, C6-7    • High cholesterol    • Lumbar herniated disc    • JUNAID (obstructive sleep apnea) 6/30/22-split    AHI-24   • Prediabetes    • Sjogren's disease (HCC)     with organ involvement and cns   • Sleep apnea     no CPAP   • Staph infection     recurrent      Past Surgical History:   Procedure Laterality Date   • ADDL NECK SPINE FUSION     • COLONOSCOPY N/A 4/17/2019    Procedure: COLONOSCOPY;  Surgeon: Tanna Ulrich MD;  Location: [facility] ENDOSCOPY   • COLONOSCOPY     • D & C  05/24/2018    Diagnostic hysteroscopy, D&C, Endometrial biopsy   • DILATION/CURETTAGE,DIAGNOSTIC     • EXCISIONAL BIOPSY LEFT  05/2020    Efrain- EXC BX -radial scar   • HYSTERECTOMY  2019    BSO   • NEEDLE BIOPSY LEFT  04/2020    LT US BX- radial scar with usual ductal hyperplasia   • OTHER SURGICAL HISTORY      C4 fusion, piece of bone removed from left hip   • OTHER SURGICAL HISTORY      ID of abscesses      Family History   Problem Relation Age of Onset   • Other (Other) Mother         bloody noses   • Diabetes Father    • Diabetes Sister    • Other (biliary sclerosis) Sister    • Heart Disorder Brother    • Cancer Brother 64        Blood CA   • Diabetes Brother    • Colon Cancer Brother 63   • Melanoma Brother    • Breast Cancer Maternal Aunt 80        Dx breast CA age 80   •  Breast Cancer Paternal Aunt 58        Dx Breast CA age 58   • Cancer Other      Social History    Socioeconomic History      Marital status: Single    Tobacco Use      Smoking status: Former        Packs/day: .1        Types: Cigarettes        Start date:         Quit date:         Years since quittin.0      Smokeless tobacco: Never      Tobacco comments: social smoker only, 1 pk for 2-3 weeks or so     Vaping Use      Vaping Use: Never used    Substance and Sexual Activity      Alcohol use: Not Currently        Alcohol/week: 0.0 standard drinks of alcohol      Drug use: No      Sexual activity: Not Currently       REVIEW OF SYSTEMS:   GENERAL: feels well otherwise  SKIN: denies any unusual skin lesions  EYES:denies blurred vision or double vision  HEENT: denies nasal congestion, sinus pain or ST  LUNGS: denies shortness of breath with exertion  CARDIOVASCULAR: denies chest pain on exertion  GI: denies abdominal pain,denies heartburn  MUSCULOSKELETAL: denies back pain  NEURO: denies headaches  PSYCHE: denies depression or anxiety  HEMATOLOGIC: denies hx of anemia  ENDOCRINE: denies thyroid history  ALL/ASTHMA: denies hx of allergy or asthma    EXAM:   /82   Pulse 82   Ht 5' 1.4\" (1.56 m)   Wt 233 lb 6.4 oz (105.9 kg)   SpO2 96%   BMI 43.53 kg/m²   GENERAL: well developed, well nourished, in no apparent distress  SKIN: no rashes, no suspicious lesions  HEENT: atraumatic, normocephalic,ears and throat are clear  EYES:PERRLA, EOMI, normal optic disk, conjunctiva are clear  NECK: supple, no adenopathy,no bruits  LUNGS: clear to auscultation  CARDIO: RRR without murmur  GI: good BS's,no masses, HSM or tenderness  MUSCULOSKELETAL: back is not tender,FROM of the back  EXTREMITIES: no cyanosis, clubbing or edema  NEURO: Oriented times three,cranial nerves are intact,motor and sensory are grossly intact  ECG:  ASSESSMENT AND PLAN:   Pia Guerra is a 63 year old female who presents for a  pre-operative physical exam. Patient is to have cataract surgery, to be done by Dr. Banuelos. 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 Sleep apnea,   stable heart failure. Pt is a good surgical candidate.

## 2024-01-16 ENCOUNTER — HOSPITAL ENCOUNTER (OUTPATIENT)
Facility: HOSPITAL | Age: 64
Setting detail: HOSPITAL OUTPATIENT SURGERY
Discharge: HOME | End: 2024-01-16
Attending: OPHTHALMOLOGY | Admitting: OPHTHALMOLOGY
Payer: MEDICARE

## 2024-01-16 ENCOUNTER — ANESTHESIA EVENT (OUTPATIENT)
Dept: SURGERY | Facility: HOSPITAL | Age: 64
End: 2024-01-16
Payer: MEDICARE

## 2024-01-16 ENCOUNTER — ANESTHESIA (OUTPATIENT)
Dept: SURGERY | Facility: HOSPITAL | Age: 64
End: 2024-01-16
Payer: MEDICARE

## 2024-01-16 VITALS
HEIGHT: 61.35 IN | DIASTOLIC BLOOD PRESSURE: 57 MMHG | HEART RATE: 84 BPM | BODY MASS INDEX: 41.87 KG/M2 | WEIGHT: 224.63 LBS | SYSTOLIC BLOOD PRESSURE: 98 MMHG | OXYGEN SATURATION: 95 % | TEMPERATURE: 97 F | RESPIRATION RATE: 12 BRPM

## 2024-01-16 PROBLEM — G47.30 SLEEP APNEA: Status: ACTIVE | Noted: 2024-01-16

## 2024-01-16 LAB — GLUCOSE BLD-MCNC: 74 MG/DL (ref 70–99)

## 2024-01-16 PROCEDURE — 08RJ3JZ REPLACEMENT OF RIGHT LENS WITH SYNTHETIC SUBSTITUTE, PERCUTANEOUS APPROACH: ICD-10-PCS | Performed by: OPHTHALMOLOGY

## 2024-01-16 PROCEDURE — 82962 GLUCOSE BLOOD TEST: CPT

## 2024-01-16 DEVICE — TECNIS SMPLCTY TECNIS 1PC CLR MONO 25.0D
Type: IMPLANTABLE DEVICE | Site: EYE | Status: FUNCTIONAL
Brand: TECNIS SIMPLICITY

## 2024-01-16 RX ORDER — TROPICAMIDE 10 MG/ML
1 SOLUTION/ DROPS OPHTHALMIC SEE ADMIN INSTRUCTIONS
Status: COMPLETED | OUTPATIENT
Start: 2024-01-16 | End: 2024-01-16

## 2024-01-16 RX ORDER — NALOXONE HYDROCHLORIDE 0.4 MG/ML
80 INJECTION, SOLUTION INTRAMUSCULAR; INTRAVENOUS; SUBCUTANEOUS AS NEEDED
Status: DISCONTINUED | OUTPATIENT
Start: 2024-01-16 | End: 2024-01-16

## 2024-01-16 RX ORDER — LIDOCAINE HYDROCHLORIDE 10 MG/ML
INJECTION, SOLUTION EPIDURAL; INFILTRATION; INTRACAUDAL; PERINEURAL AS NEEDED
Status: DISCONTINUED | OUTPATIENT
Start: 2024-01-16 | End: 2024-01-16 | Stop reason: HOSPADM

## 2024-01-16 RX ORDER — ACETAMINOPHEN 500 MG
1000 TABLET ORAL ONCE
Status: DISCONTINUED | OUTPATIENT
Start: 2024-01-16 | End: 2024-01-16 | Stop reason: HOSPADM

## 2024-01-16 RX ORDER — PHENYLEPHRINE HYDROCHLORIDE 25 MG/ML
1 SOLUTION/ DROPS OPHTHALMIC SEE ADMIN INSTRUCTIONS
Status: COMPLETED | OUTPATIENT
Start: 2024-01-16 | End: 2024-01-16

## 2024-01-16 RX ORDER — PREDNISOLONE ACETATE 10 MG/ML
1 SUSPENSION/ DROPS OPHTHALMIC 4 TIMES DAILY
COMMUNITY
Start: 2023-04-25

## 2024-01-16 RX ORDER — CYCLOPENTOLATE HYDROCHLORIDE 10 MG/ML
1 SOLUTION/ DROPS OPHTHALMIC SEE ADMIN INSTRUCTIONS
Status: COMPLETED | OUTPATIENT
Start: 2024-01-16 | End: 2024-01-16

## 2024-01-16 RX ORDER — KETOROLAC TROMETHAMINE 5 MG/ML
1 SOLUTION OPHTHALMIC 4 TIMES DAILY
Status: DISCONTINUED | OUTPATIENT
Start: 2024-01-16 | End: 2024-01-16

## 2024-01-16 RX ORDER — PROCHLORPERAZINE EDISYLATE 5 MG/ML
5 INJECTION INTRAMUSCULAR; INTRAVENOUS EVERY 8 HOURS PRN
Status: DISCONTINUED | OUTPATIENT
Start: 2024-01-16 | End: 2024-01-16

## 2024-01-16 RX ORDER — TETRACAINE HYDROCHLORIDE 5 MG/ML
1 SOLUTION OPHTHALMIC EVERY 10 MIN PRN
Status: DISCONTINUED | OUTPATIENT
Start: 2024-01-16 | End: 2024-01-16

## 2024-01-16 RX ORDER — OFLOXACIN 3 MG/ML
SOLUTION/ DROPS OPHTHALMIC
COMMUNITY
Start: 2023-11-07

## 2024-01-16 RX ORDER — TETRACAINE HYDROCHLORIDE 5 MG/ML
1 SOLUTION OPHTHALMIC SEE ADMIN INSTRUCTIONS
Status: COMPLETED | OUTPATIENT
Start: 2024-01-16 | End: 2024-01-16

## 2024-01-16 RX ORDER — MOXIFLOXACIN 5 MG/ML
SOLUTION/ DROPS OPHTHALMIC AS NEEDED
Status: DISCONTINUED | OUTPATIENT
Start: 2024-01-16 | End: 2024-01-16 | Stop reason: HOSPADM

## 2024-01-16 RX ORDER — MEPERIDINE HYDROCHLORIDE 25 MG/ML
12.5 INJECTION INTRAMUSCULAR; INTRAVENOUS; SUBCUTANEOUS AS NEEDED
Status: DISCONTINUED | OUTPATIENT
Start: 2024-01-16 | End: 2024-01-16

## 2024-01-16 RX ORDER — LABETALOL HYDROCHLORIDE 5 MG/ML
5 INJECTION, SOLUTION INTRAVENOUS EVERY 5 MIN PRN
Status: DISCONTINUED | OUTPATIENT
Start: 2024-01-16 | End: 2024-01-16

## 2024-01-16 RX ORDER — ONDANSETRON 2 MG/ML
4 INJECTION INTRAMUSCULAR; INTRAVENOUS EVERY 6 HOURS PRN
Status: DISCONTINUED | OUTPATIENT
Start: 2024-01-16 | End: 2024-01-16

## 2024-01-16 RX ORDER — NICOTINE POLACRILEX 4 MG
30 LOZENGE BUCCAL
Status: DISCONTINUED | OUTPATIENT
Start: 2024-01-16 | End: 2024-01-16 | Stop reason: HOSPADM

## 2024-01-16 RX ORDER — SODIUM CHLORIDE, SODIUM LACTATE, POTASSIUM CHLORIDE, CALCIUM CHLORIDE 600; 310; 30; 20 MG/100ML; MG/100ML; MG/100ML; MG/100ML
INJECTION, SOLUTION INTRAVENOUS CONTINUOUS
Status: DISCONTINUED | OUTPATIENT
Start: 2024-01-16 | End: 2024-01-16

## 2024-01-16 RX ORDER — BALANCED SALT SOLUTION 6.4; .75; .48; .3; 3.9; 1.7 MG/ML; MG/ML; MG/ML; MG/ML; MG/ML; MG/ML
SOLUTION OPHTHALMIC AS NEEDED
Status: DISCONTINUED | OUTPATIENT
Start: 2024-01-16 | End: 2024-01-16 | Stop reason: HOSPADM

## 2024-01-16 RX ORDER — SCOLOPAMINE TRANSDERMAL SYSTEM 1 MG/1
1 PATCH, EXTENDED RELEASE TRANSDERMAL ONCE
Status: DISCONTINUED | OUTPATIENT
Start: 2024-01-16 | End: 2024-01-16 | Stop reason: HOSPADM

## 2024-01-16 RX ORDER — DIPHENHYDRAMINE HYDROCHLORIDE 50 MG/ML
12.5 INJECTION INTRAMUSCULAR; INTRAVENOUS AS NEEDED
Status: DISCONTINUED | OUTPATIENT
Start: 2024-01-16 | End: 2024-01-16

## 2024-01-16 RX ORDER — PREDNISOLONE ACETATE 10 MG/ML
SUSPENSION/ DROPS OPHTHALMIC AS NEEDED
Status: DISCONTINUED | OUTPATIENT
Start: 2024-01-16 | End: 2024-01-16 | Stop reason: HOSPADM

## 2024-01-16 RX ORDER — DEXTROSE MONOHYDRATE 25 G/50ML
50 INJECTION, SOLUTION INTRAVENOUS
Status: DISCONTINUED | OUTPATIENT
Start: 2024-01-16 | End: 2024-01-16 | Stop reason: HOSPADM

## 2024-01-16 RX ORDER — ACETAMINOPHEN 500 MG
1000 TABLET ORAL ONCE AS NEEDED
Status: DISCONTINUED | OUTPATIENT
Start: 2024-01-16 | End: 2024-01-16

## 2024-01-16 RX ORDER — MIDAZOLAM HYDROCHLORIDE 1 MG/ML
INJECTION INTRAMUSCULAR; INTRAVENOUS AS NEEDED
Status: DISCONTINUED | OUTPATIENT
Start: 2024-01-16 | End: 2024-01-16 | Stop reason: SURG

## 2024-01-16 RX ORDER — NICOTINE POLACRILEX 4 MG
15 LOZENGE BUCCAL
Status: DISCONTINUED | OUTPATIENT
Start: 2024-01-16 | End: 2024-01-16 | Stop reason: HOSPADM

## 2024-01-16 RX ORDER — TETRACAINE HYDROCHLORIDE 5 MG/ML
SOLUTION OPHTHALMIC AS NEEDED
Status: DISCONTINUED | OUTPATIENT
Start: 2024-01-16 | End: 2024-01-16 | Stop reason: HOSPADM

## 2024-01-16 RX ADMIN — SODIUM CHLORIDE, SODIUM LACTATE, POTASSIUM CHLORIDE, CALCIUM CHLORIDE: 600; 310; 30; 20 INJECTION, SOLUTION INTRAVENOUS at 12:50:00

## 2024-01-16 RX ADMIN — MIDAZOLAM HYDROCHLORIDE 1 MG: 1 INJECTION INTRAMUSCULAR; INTRAVENOUS at 12:54:00

## 2024-01-16 NOTE — DISCHARGE INSTRUCTIONS
Home Instructions Following Cataract Surgery    [place] office: [phone]    Your eyes may be dilated in the office tomorrow so you may need a .    It is not uncommon to have blurry and/ or double vision.    Eye Kit:  Bring this kit to the office with you with all your eye drops in it for each visit.  Instructions for eye drops are on any attached form.  Put the lens information card with your medical information.    Discomfort After Surgery: It is not uncommon to experience some discomfort after your surgery,  such as:  Scratchy feeling in the eye-(almost like an eyelash in your eye). This may be from your incision and should heal with time.  Soreness and headache-If this happens, you may take aspirin, Tylenol, or whatever you  Commonly take for aches and pains    Lifestyles:  Continue your regular medications as recommended by your family doctor.  No swimming/hot tubs for two weeks.  Lifting up to 50lbs only  For the next 24 hours we advise:  1.   Do not drive a car, operate machinery or power tools.  2.   Do not drink alcoholic beverages.    Anesthetics:  The anesthetic we used around your eye will probably wear off in 2-10 hours. Your head may feel  numb on the side you had surgery. The anesthetic may also cause you to have some double vision  until it wears off completely.    DO NOT RUB YOUR EYE.    Post-op Eye Drops Instructions  You will need to use two different eye drops following your procedure.  However, if you have an eye patch on your eye these eye drops will not begin until the doctor removes your eye patch tomorrow.    Start the antibiotic and steroid eye drops after getting home from the hospital.  Use the eye drops 4 times a day.  If any additional eye drops were sent to your pharmacy, use them as ordered.  Wash your hands prior to using your eye drops.  Shake the bottle well.  Make sure the tip of the bottle does not touch your eye or eye lashes.  Wait 5  minutes between drops  Continue using your regular medications including glaucoma medicine.  Wear your plastic eye shield for the first 3 nights.      In case of emergency: Call [phone]   Please do not hesitate to call if you have any concerns or  questions before being seen by your doctor.    If it is after hours, our answering service will put you in touch with one of our doctors.

## 2024-01-16 NOTE — ANESTHESIA PREPROCEDURE EVALUATION
PRE-OP EVALUATION    Patient Name: Pia Guerra    Admit Diagnosis: COMBINED AGE RELATED CATARACT    Pre-op Diagnosis: COMBINED AGE RELATED CATARACT    PHACOEMULSIFICATION OF THE RIGHT CATARACT WITH PLACEMENT OF INTRAOCULAR LENS IMPLANT    Anesthesia Procedure: PHACOEMULSIFICATION OF THE RIGHT CATARACT WITH PLACEMENT OF INTRAOCULAR LENS IMPLANT (Right: Eye)    Surgeon(s) and Role:     * Bennett Banuelos MD - Primary    Pre-op vitals reviewed.  Temp: 97.4 °F (36.3 °C)  Pulse: 101  BP: 127/87  SpO2: 95 %  Body mass index is 41.95 kg/m².    Current medications reviewed.  Hospital Medications:   [MAR Hold] acetaminophen (Tylenol Extra Strength) tab 1,000 mg  1,000 mg Oral Once    [MAR Hold] scopolamine (Transderm-Scop) 1 MG/3DAYS patch 1 patch  1 patch Transdermal Once    [MAR Hold] glucose (Dex4) 15 GM/59ML oral liquid 15 g  15 g Oral Q15 Min PRN    Or    [MAR Hold] glucose (Glutose) 40% oral gel 15 g  15 g Oral Q15 Min PRN    Or    [MAR Hold] glucose-vitamin C (Dex-4) chewable tab 4 tablet  4 tablet Oral Q15 Min PRN    Or    [MAR Hold] dextrose 50% injection 50 mL  50 mL Intravenous Q15 Min PRN    Or    [MAR Hold] glucose (Dex4) 15 GM/59ML oral liquid 30 g  30 g Oral Q15 Min PRN    Or    [MAR Hold] glucose (Glutose) 40% oral gel 30 g  30 g Oral Q15 Min PRN    Or    [MAR Hold] glucose-vitamin C (Dex-4) chewable tab 8 tablet  8 tablet Oral Q15 Min PRN    lactated ringers infusion   Intravenous Continuous    [COMPLETED] tetracaine (Pontocaine) 0.5 % ophthalmic solution 1 drop  1 drop Ophthalmic See Admin Instructions    [COMPLETED] tropicamide (Mydriacyl) 1 % ophthalmic solution 1 drop  1 drop Ophthalmic See Admin Instructions    [COMPLETED] cyclopentolate (Cyclogyl) 1 % ophthalmic solution 1 drop  1 drop Ophthalmic See Admin Instructions    [COMPLETED] phenylephrine (Mydfrin) 2.5 % ophthalmic solution 1 drop  1 drop Ophthalmic See Admin Instructions    balanced salt solution (BSS) intraocular solution    PRN    lidocaine  PF (Xylocaine-MPF) 1% injection    PRN    sodium hyaluronate-sodium chondroitin-sodium hyaluronate (Duovisc) 0.55-0.5 ML intra-ocular kit    PRN    moxifloxacin (Vigamox) 0.5 % ophthalmic solution    PRN    [COMPLETED] EPINEPHrine (PF) - lidocaine (Adrenalin-Xylocaine) 1 mg - 4% in BSS ophthalmic irrigation   Irrigation Once (Intra-Op)       Outpatient Medications:     Medications Prior to Admission   Medication Sig Dispense Refill Last Dose    ofloxacin 0.3 % Ophthalmic Solution    post op    prednisoLONE 1 % Ophthalmic Suspension Apply 1 drop to eye 4 (four) times daily.   post op    DULoxetine 30 MG Oral Cap DR Particles Take 1 capsule (30 mg total) by mouth daily.   1/16/2024    semaglutide 4 MG/3ML Subcutaneous Solution Pen-injector Inject 1 mg into the skin once a week.   1/9/2024    albuterol (5 MG/ML) 0.5% Inhalation Nebu Soln Take 0.5 mL (2.5 mg total) by nebulization as needed for Wheezing.   1/2/2024    ALBUTEROL SULFATE IN Inhale into the lungs as needed.       benzonatate 100 MG Oral Cap Take 1 capsule (100 mg total) by mouth as needed for cough.   12/26/2023    PAROXETINE HCL 40 MG Oral Tab TAKE 1 TABLET(40 MG) BY MOUTH EVERY MORNING 90 tablet 1     rosuvastatin 10 MG Oral Tab Take 1 tablet (10 mg total) by mouth nightly. 90 tablet 0 1/15/2024    pantoprazole 40 MG Oral Tab EC Take 1 tab by mouth 30 minutes before breakfast and 30 minutes before dinner daily for 2 weeks (Patient taking differently: Take 1 tablet (40 mg total) by mouth every morning before breakfast. Take 1 tab by mouth 30 minutes before breakfast and 30 minutes before dinner daily for 2 weeks) 120 tablet 2 1/15/2024    ERGOCALCIFEROL 1.25 MG (81993 UT) Oral Cap TAKE 1 CAPSULE BY MOUTH EVERY 7 DAYS 12 capsule 3 1/9/2024    Acetaminophen (TYLENOL ARTHRITIS EXT RELIEF OR) Take 2 tablets by mouth 2 (two) times daily.   1/16/2024    Cyanocobalamin (VITAMIN B 12 OR) Take by mouth daily.   1/9/2024    OXCARBAZEPINE 150 MG Oral Tab TAKE 1  TABLET(150 MG) BY MOUTH TWICE DAILY 180 tablet 1 More than a month    cycloSPORINE 0.05 % Ophthalmic Emulsion Place 1 drop into both eyes 2 (two) times daily. 180 each 5 More than a month    HYDROcodone-acetaminophen 5-325 MG Oral Tab Take 1 tablet by mouth every 6 (six) hours as needed for Pain.   More than a month       Allergies: Cephalexin, Morphine and related [opioid analgesics], Naproxen, Oxycodone, Aspirin, Lactose, and Zithromax [azithromycin]      Anesthesia Evaluation    Patient summary reviewed.    Anesthetic Complications  (-) history of anesthetic complications         GI/Hepatic/Renal      (+) GERD                           Cardiovascular      ECG reviewed.  Exercise tolerance: good     MET: >4    (+) obesity                       (+) CHF  (-) angina     (-) ARDON  (-) orthopnea  (-) PND     Endo/Other      (+) diabetes  type 2,                          Pulmonary      (+) asthma              (+) sleep apnea and CPAP and noncompliant      Neuro/Psych      (+) depression                                Past Surgical History:   Procedure Laterality Date    COLONOSCOPY N/A 2019    Procedure: COLONOSCOPY;  Surgeon: Tanna Ulrich MD;  Location: [facility] ENDOSCOPY    D & C  2018    Diagnostic hysteroscopy, D&C, Endometrial biopsy    EXCISIONAL BIOPSY LEFT  2020    Efrain- EXC BX -radial scar    HYSTERECTOMY  2019    BSO    NEEDLE BIOPSY LEFT  2020    LT US BX- radial scar with usual ductal hyperplasia    OTHER SURGICAL HISTORY      C4 fusion, piece of bone removed from left hip    OTHER SURGICAL HISTORY      ID of abscesses     Social History     Socioeconomic History    Marital status: Single   Tobacco Use    Smoking status: Former     Types: Cigarettes     Quit date: 1976     Years since quittin.0    Smokeless tobacco: Never   Vaping Use    Vaping Use: Never used   Substance and Sexual Activity    Alcohol use: Not Currently     Alcohol/week: 0.0 standard drinks of alcohol    Drug use:  No    Sexual activity: Not Currently     History   Drug Use No     Available pre-op labs reviewed.               Airway      Mallampati: II  Mouth opening: 3 FB  TM distance: 4 - 6 cm  Neck ROM: limited Cardiovascular    Cardiovascular exam normal.  Rhythm: regular  Rate: normal  (-) murmur   Dental    Dentition appears grossly intact         Pulmonary    Pulmonary exam normal.  Breath sounds clear to auscultation bilaterally.        (-) wheezes       Other findings            ----------------------------------------------------------------------------   Conclusions     Summary:     1. Left ventricle: The cavity size is normal. Wall thickness is normal.      Systolic function is normal. The estimated ejection fraction is 55-60%.      Wall motion is normal; there are no regional wall motion abnormalities.      Doppler parameters are consistent with abnormal left ventricular      relaxation (grade 1 diastolic dysfunction).   2. Aortic valve: There is trace regurgitation.   3. Left atrium: The atrium is mildly dilated.     Impressions:  No previous study was available for comparison.     Prepared and signed by   Leonel Cheek M.D.   03/20/2020 08:46     ASA: 3   Plan: MAC  NPO status verified and patient meets guidelines.  Patient has taken beta blockers in last 24 hours.        Plan/risks discussed with: patient            We discussed MAC anesthesia/sedation with GA as back up.  We discussed the goal of safe and acceptable sedation that may have interlude(s) of consciousness.  We discussed possible need for PONV prophylaxis and analgesic plan as needed.  The patient's questions were answered and consent was attained.

## 2024-01-16 NOTE — OPERATIVE REPORT
OPERATIVE REPORT    PREOPERATIVE/POSTOPERATIVE DIAGNOSIS: COMBINED AGE RELATED CATARACT FORMATION, RIGHT EYE    OPERATION PERFORMED:   PHACOEMULSIFICATION WITH POSTERIOR CHAMBER       INTRAOCULAR LENS, RIGHT EYE    SURGEON:      SHEELA JAMESON M.D.    TYPE OF ANESTHESIA:   MONITORED ANESTHESIA CARE    COMPLICATIONS:   NONE    ESTIMATED BLOOD LOSS:   NONE    REASON FOR SURGERY:   DECREASED VISION, RIGHT EYE    TECHNIQUE:  The patient was brought to the preoperative area.  The patient had a clear understanding of the cataract in the right eye, as well as the cataract removal procedure.  All questions were answered regarding the procedure and condition.  No guarantees were offered.  The risks of loss of eye, life, etc. were all understood.  All other risks, benefits, alternatives to cataract surgery were explained to and understood by the patient.  The patient was alert at the time of the discussion.  The patient had dilation and anesthetic eye drops started in the preoperative area.  The patient was then taken to the operating room, and the face was prepped and draped.  Topical anesthesia was continued.  A temporal clear corneal approach was used for the right eye.  A paracentesis was created.  Preservative-free shugarcaine was injected into the anterior chamber.  Viscoelastics were used to keep the anterior chamber formed and endothelium protected.  Next, the temporal corneal incision was created using a keratome blade.  A cystome was used to begin the capsulorrhexis.  It was completed using Utrata forceps.  Next, BSS from the syringe with a cannula tip was used to perform hydrodissection of the lens and it rotated freely.  The same instrument was also used to perform hydroelineation.  Next, the phacoemulsification unit hand piece was used with a second instrument to perform removal of the cataract.  All remaining cortical material as well as accessible lens epithelial cells underneath the anterior capsule were  removed with irrigation and aspiration using polish mode for the lens epithelial cells.  Next, the capsular bag was reinflated with Provisc.  A foldable acrylic lens, Tecnis, DC [id number], 25 dioptre, was injected into the capsular bag and allowed to unfold.  The lens remained well centered throughout the rest of the procedure.  BSS through a cannula was used to reform the chamber.  It was also used to perform stroma hydration of the temporal cornea, as well as the paracentesis incision sites. For patients without fluoroquinolone allergy, less than 0.1cc  intracameral vigamox was injected into the eye.  Intraocular pressure was normal.  If not allergic to it, betadine 5% eye drops were placed. The wounds showed no leakage.  The eyelid speculum, tapes and drapes were all removed.   The eyes were protected with sunglasses.  The patient went to the recovery area in stable condition, having tolerated the procedure well.

## 2024-01-16 NOTE — ANESTHESIA POSTPROCEDURE EVALUATION
[facility]    Pia Guerra Patient Status:  Hospital Outpatient Surgery   Age/Gender 63 year old female MRN [id number]   Location [facility] PERIOPERATIVE SERVICE Attending Bennett Banuelos MD   Hosp Day # 0 PCP Carissa Flores DO       Anesthesia Post-op Note    PHACOEMULSIFICATION OF THE RIGHT CATARACT WITH PLACEMENT OF INTRAOCULAR LENS IMPLANT    Procedure Summary       Date: 01/16/24 Room / Location:  MAIN OR 01 /  MAIN OR    Anesthesia Start: 1250 Anesthesia Stop: 1330    Procedure: PHACOEMULSIFICATION OF THE RIGHT CATARACT WITH PLACEMENT OF INTRAOCULAR LENS IMPLANT (Right: Eye) Diagnosis: (COMBINED AGE RELATED CATARACT)    Surgeons: Bennett Banuelos MD Anesthesiologist: Jose Eckert MD    Anesthesia Type: MAC ASA Status: 3            Anesthesia Type: MAC    Vitals Value Taken Time   /58 01/16/24 1354   Temp 97.2 °F (36.2 °C) 01/16/24 1334   Pulse 71 01/16/24 1357   Resp 12 01/16/24 1334   SpO2 97 % 01/16/24 1357   Vitals shown include unfiled device data.    Patient Location: Same Day Surgery    Anesthesia Type: MAC    Airway Patency: patent    Postop Pain Control: adequate    Mental Status: preanesthetic baseline    Nausea/Vomiting: none    Cardiopulmonary/Hydration status: stable euvolemic    Complications: no apparent anesthesia related complications    Postop vital signs: stable    Dental Exam: Unchanged from Preop    Patient to be discharged home when criteria met.

## 2024-01-16 NOTE — INTERVAL H&P NOTE
Pre-op Diagnosis: COMBINED AGE RELATED CATARACT    The above referenced H&P was reviewed by Bennett Banuelos MD on 1/16/2024, the patient was examined and no significant changes have occurred in the patient's condition since the H&P was performed.  I discussed with the patient and/or legal representative the potential benefits, risks and side effects of this procedure; the likelihood of the patient achieving goals; and potential problems that might occur during recuperation.  I discussed reasonable alternatives to the procedure, including risks, benefits and side effects related to the alternatives and risks related to not receiving this procedure.  We will proceed with procedure as planned.

## 2024-02-20 ENCOUNTER — ANESTHESIA EVENT (OUTPATIENT)
Dept: SURGERY | Facility: HOSPITAL | Age: 64
End: 2024-02-20
Payer: MEDICARE

## 2024-02-20 ENCOUNTER — HOSPITAL ENCOUNTER (OUTPATIENT)
Facility: HOSPITAL | Age: 64
Setting detail: HOSPITAL OUTPATIENT SURGERY
Discharge: HOME | End: 2024-02-20
Attending: OPHTHALMOLOGY | Admitting: OPHTHALMOLOGY
Payer: MEDICARE

## 2024-02-20 ENCOUNTER — ANESTHESIA (OUTPATIENT)
Dept: SURGERY | Facility: HOSPITAL | Age: 64
End: 2024-02-20
Payer: MEDICARE

## 2024-02-20 VITALS
HEIGHT: 61.5 IN | DIASTOLIC BLOOD PRESSURE: 59 MMHG | HEART RATE: 79 BPM | BODY MASS INDEX: 39.14 KG/M2 | TEMPERATURE: 97 F | RESPIRATION RATE: 18 BRPM | WEIGHT: 210 LBS | SYSTOLIC BLOOD PRESSURE: 120 MMHG | OXYGEN SATURATION: 98 %

## 2024-02-20 LAB — GLUCOSE BLD-MCNC: 87 MG/DL (ref 70–99)

## 2024-02-20 PROCEDURE — 08RK3JZ REPLACEMENT OF LEFT LENS WITH SYNTHETIC SUBSTITUTE, PERCUTANEOUS APPROACH: ICD-10-PCS | Performed by: OPHTHALMOLOGY

## 2024-02-20 PROCEDURE — 82962 GLUCOSE BLOOD TEST: CPT

## 2024-02-20 DEVICE — IMPLANTABLE DEVICE: Type: IMPLANTABLE DEVICE | Site: EYE | Status: FUNCTIONAL

## 2024-02-20 RX ORDER — HYDROMORPHONE HYDROCHLORIDE 1 MG/ML
0.2 INJECTION, SOLUTION INTRAMUSCULAR; INTRAVENOUS; SUBCUTANEOUS EVERY 5 MIN PRN
Status: DISCONTINUED | OUTPATIENT
Start: 2024-02-20 | End: 2024-02-20

## 2024-02-20 RX ORDER — TETRACAINE HYDROCHLORIDE 5 MG/ML
1 SOLUTION OPHTHALMIC SEE ADMIN INSTRUCTIONS
Status: COMPLETED | OUTPATIENT
Start: 2024-02-20 | End: 2024-02-20

## 2024-02-20 RX ORDER — SCOLOPAMINE TRANSDERMAL SYSTEM 1 MG/1
1 PATCH, EXTENDED RELEASE TRANSDERMAL ONCE
Status: DISCONTINUED | OUTPATIENT
Start: 2024-02-20 | End: 2024-02-20 | Stop reason: HOSPADM

## 2024-02-20 RX ORDER — ONDANSETRON 2 MG/ML
4 INJECTION INTRAMUSCULAR; INTRAVENOUS EVERY 6 HOURS PRN
Status: DISCONTINUED | OUTPATIENT
Start: 2024-02-20 | End: 2024-02-20

## 2024-02-20 RX ORDER — SODIUM CHLORIDE, SODIUM LACTATE, POTASSIUM CHLORIDE, CALCIUM CHLORIDE 600; 310; 30; 20 MG/100ML; MG/100ML; MG/100ML; MG/100ML
INJECTION, SOLUTION INTRAVENOUS CONTINUOUS
Status: DISCONTINUED | OUTPATIENT
Start: 2024-02-20 | End: 2024-02-20

## 2024-02-20 RX ORDER — DEXTROSE MONOHYDRATE 25 G/50ML
50 INJECTION, SOLUTION INTRAVENOUS
Status: DISCONTINUED | OUTPATIENT
Start: 2024-02-20 | End: 2024-02-20 | Stop reason: HOSPADM

## 2024-02-20 RX ORDER — MIDAZOLAM HYDROCHLORIDE 1 MG/ML
INJECTION INTRAMUSCULAR; INTRAVENOUS AS NEEDED
Status: DISCONTINUED | OUTPATIENT
Start: 2024-02-20 | End: 2024-02-20 | Stop reason: SURG

## 2024-02-20 RX ORDER — ACETAMINOPHEN 500 MG
1000 TABLET ORAL ONCE AS NEEDED
Status: DISCONTINUED | OUTPATIENT
Start: 2024-02-20 | End: 2024-02-20

## 2024-02-20 RX ORDER — NICOTINE POLACRILEX 4 MG
15 LOZENGE BUCCAL
Status: DISCONTINUED | OUTPATIENT
Start: 2024-02-20 | End: 2024-02-20 | Stop reason: HOSPADM

## 2024-02-20 RX ORDER — TROPICAMIDE 10 MG/ML
1 SOLUTION/ DROPS OPHTHALMIC SEE ADMIN INSTRUCTIONS
Status: COMPLETED | OUTPATIENT
Start: 2024-02-20 | End: 2024-02-20

## 2024-02-20 RX ORDER — ACETAMINOPHEN 500 MG
1000 TABLET ORAL ONCE
Status: DISCONTINUED | OUTPATIENT
Start: 2024-02-20 | End: 2024-02-20 | Stop reason: HOSPADM

## 2024-02-20 RX ORDER — MOXIFLOXACIN 5 MG/ML
SOLUTION/ DROPS OPHTHALMIC AS NEEDED
Status: DISCONTINUED | OUTPATIENT
Start: 2024-02-20 | End: 2024-02-20 | Stop reason: HOSPADM

## 2024-02-20 RX ORDER — CYCLOPENTOLATE HYDROCHLORIDE 10 MG/ML
1 SOLUTION/ DROPS OPHTHALMIC SEE ADMIN INSTRUCTIONS
Status: COMPLETED | OUTPATIENT
Start: 2024-02-20 | End: 2024-02-20

## 2024-02-20 RX ORDER — PHENYLEPHRINE HYDROCHLORIDE 25 MG/ML
1 SOLUTION/ DROPS OPHTHALMIC SEE ADMIN INSTRUCTIONS
Status: COMPLETED | OUTPATIENT
Start: 2024-02-20 | End: 2024-02-20

## 2024-02-20 RX ORDER — LIDOCAINE HYDROCHLORIDE 10 MG/ML
INJECTION, SOLUTION EPIDURAL; INFILTRATION; INTRACAUDAL; PERINEURAL AS NEEDED
Status: DISCONTINUED | OUTPATIENT
Start: 2024-02-20 | End: 2024-02-20 | Stop reason: HOSPADM

## 2024-02-20 RX ORDER — BALANCED SALT SOLUTION 6.4; .75; .48; .3; 3.9; 1.7 MG/ML; MG/ML; MG/ML; MG/ML; MG/ML; MG/ML
SOLUTION OPHTHALMIC AS NEEDED
Status: DISCONTINUED | OUTPATIENT
Start: 2024-02-20 | End: 2024-02-20 | Stop reason: HOSPADM

## 2024-02-20 RX ORDER — HYDROMORPHONE HYDROCHLORIDE 1 MG/ML
0.4 INJECTION, SOLUTION INTRAMUSCULAR; INTRAVENOUS; SUBCUTANEOUS EVERY 5 MIN PRN
Status: DISCONTINUED | OUTPATIENT
Start: 2024-02-20 | End: 2024-02-20

## 2024-02-20 RX ORDER — PREDNISOLONE ACETATE 10 MG/ML
SUSPENSION/ DROPS OPHTHALMIC AS NEEDED
Status: DISCONTINUED | OUTPATIENT
Start: 2024-02-20 | End: 2024-02-20 | Stop reason: HOSPADM

## 2024-02-20 RX ORDER — TETRACAINE HYDROCHLORIDE 5 MG/ML
SOLUTION OPHTHALMIC AS NEEDED
Status: DISCONTINUED | OUTPATIENT
Start: 2024-02-20 | End: 2024-02-20 | Stop reason: HOSPADM

## 2024-02-20 RX ORDER — HYDROMORPHONE HYDROCHLORIDE 1 MG/ML
0.6 INJECTION, SOLUTION INTRAMUSCULAR; INTRAVENOUS; SUBCUTANEOUS EVERY 5 MIN PRN
Status: DISCONTINUED | OUTPATIENT
Start: 2024-02-20 | End: 2024-02-20

## 2024-02-20 RX ORDER — NICOTINE POLACRILEX 4 MG
30 LOZENGE BUCCAL
Status: DISCONTINUED | OUTPATIENT
Start: 2024-02-20 | End: 2024-02-20 | Stop reason: HOSPADM

## 2024-02-20 RX ORDER — NALOXONE HYDROCHLORIDE 0.4 MG/ML
0.08 INJECTION, SOLUTION INTRAMUSCULAR; INTRAVENOUS; SUBCUTANEOUS AS NEEDED
Status: DISCONTINUED | OUTPATIENT
Start: 2024-02-20 | End: 2024-02-20

## 2024-02-20 RX ADMIN — MIDAZOLAM HYDROCHLORIDE 0.5 MG: 1 INJECTION INTRAMUSCULAR; INTRAVENOUS at 11:29:00

## 2024-02-20 RX ADMIN — SODIUM CHLORIDE, SODIUM LACTATE, POTASSIUM CHLORIDE, CALCIUM CHLORIDE: 600; 310; 30; 20 INJECTION, SOLUTION INTRAVENOUS at 11:53:00

## 2024-02-20 RX ADMIN — SODIUM CHLORIDE, SODIUM LACTATE, POTASSIUM CHLORIDE, CALCIUM CHLORIDE: 600; 310; 30; 20 INJECTION, SOLUTION INTRAVENOUS at 11:23:00

## 2024-02-20 NOTE — ANESTHESIA POSTPROCEDURE EVALUATION
[facility]    Pia Guerra Patient Status:  Hospital Outpatient Surgery   Age/Gender 63 year old female MRN [id number]   Location [facility] SURGERY Attending Bennett Banuelos MD   Hosp Day # 0 PCP Carissa Flores DO       Anesthesia Post-op Note    PHACOEMULSIFICATION OF THE LEFT CATARACT WITH PLACEMENT OF INTRAOCULAR LENS IMPLANT    Procedure Summary       Date: 02/20/24 Room / Location:  MAIN OR 01 / [facility] MAIN OR    Anesthesia Start: 1123 Anesthesia Stop: 1203    Procedure: PHACOEMULSIFICATION OF THE LEFT CATARACT WITH PLACEMENT OF INTRAOCULAR LENS IMPLANT (Left) Diagnosis: (COMBINED AGE RELATED CATARACT)    Surgeons: Bennett Banuelos MD Anesthesiologist: Raghu Arellano MD    Anesthesia Type: MAC ASA Status: 3            Anesthesia Type: MAC    Vitals Value Taken Time   /67 02/20/24 1203   Temp 97.4 02/20/24 1203   Pulse 68 02/20/24 1203   Resp 12 02/20/24 1203   SpO2 100% 02/20/24 1203       Patient Location: Same Day Surgery    Anesthesia Type: MAC    Airway Patency: patent    Postop Pain Control: adequate    Mental Status: preanesthetic baseline    Nausea/Vomiting: none    Cardiopulmonary/Hydration status: stable euvolemic    Complications: no apparent anesthesia related complications    Postop vital signs: stable    Dental Exam: Unchanged from Preop    Patient to be discharged from PACU when criteria met.

## 2024-02-20 NOTE — H&P
[facility]  History & Physical    Pia Guerra Patient Status:  Hospital Outpatient Surgery    1960 MRN [id number]   Location [facility] PRE OP HOLDING Attending Bennett Banuelos MD   Hosp Day # 0 PCP Carissa Flores DO     Admitting Diagnosis:  COMBINED AGE RELATED CATARACT    History of Present Illness:  Pia Guerra is a(n) 63 year old female. Cataract    Past Medical History:  Past Medical History:   Diagnosis Date    Anxiety state     Asthma (HCC)     Bell's palsy     Brain lesion     and spinal lesion from Sjogrens    Depression     Diverticulosis of large intestine     Esophageal reflux     Factor 5 Leiden mutation, heterozygous (HCC)     Family history of colon cancer     Fibromyalgia     Herniated nucleus pulposus, C6-7     High cholesterol     Hx of motion sickness     Leukoencephalopathy     Lumbar herniated disc     Neuropathy     hands and feet    Pneumonia due to organism     Prediabetes     Sjogren's disease (HCC)     with organ involvement and cns    Sleep apnea 2024    C-PAP but is not currently using because mask needs to be changed    Staph infection     recurrent       Past Surgical History:  Past Surgical History:   Procedure Laterality Date    COLONOSCOPY N/A 2019    Procedure: COLONOSCOPY;  Surgeon: Tanna Ulrich MD;  Location: [facility] ENDOSCOPY    D & C  2018    Diagnostic hysteroscopy, D&C, Endometrial biopsy    EXCISIONAL BIOPSY LEFT  2020    Efrain- EXC BX -radial scar    HYSTERECTOMY  2019    BSO    NEEDLE BIOPSY LEFT  2020    LT US BX- radial scar with usual ductal hyperplasia    OTHER SURGICAL HISTORY      C4 fusion, piece of bone removed from left hip    OTHER SURGICAL HISTORY      ID of abscesses       Allergies:  Allergies   Allergen Reactions    Cephalexin HIVES    Morphine And Related [Opioid Analgesics] NAUSEA AND VOMITING    Naproxen OTHER (SEE COMMENTS)     Pt states she has erosion in her stomach, cannot take this med.    Oxycodone NAUSEA  AND VOMITING    Aspirin OTHER (SEE COMMENTS)     Pt stated that Aspirin upset stomach    Lactose PAIN and DIARRHEA    Zithromax [Azithromycin] OTHER (SEE COMMENTS)     Elevates her blood pressure       Medications:  Medications Prior to Admission   Medication Sig Dispense Refill Last Dose    diclofenac 1 % External Gel Apply topically 4 (four) times daily.   2/18/2024    Cyanocobalamin (VITAMIN B 12 OR) Take by mouth daily.   2/6/2024    DULoxetine 30 MG Oral Cap DR Particles Take 1 capsule (30 mg total) by mouth daily.   2/19/2024 at 2100    semaglutide 4 MG/3ML Subcutaneous Solution Pen-injector Inject 1 mg into the skin once a week.   2/13/2024    albuterol (5 MG/ML) 0.5% Inhalation Nebu Soln Take 0.5 mL (2.5 mg total) by nebulization as needed for Wheezing.   Past Month    ALBUTEROL SULFATE IN Inhale into the lungs as needed.   2/13/2024    benzonatate 100 MG Oral Cap Take 1 capsule (100 mg total) by mouth as needed for cough.   Past Month    PAROXETINE HCL 40 MG Oral Tab TAKE 1 TABLET(40 MG) BY MOUTH EVERY MORNING 90 tablet 1 not taking    rosuvastatin 10 MG Oral Tab Take 1 tablet (10 mg total) by mouth nightly. 90 tablet 0 2/19/2024 at 2100    pantoprazole 40 MG Oral Tab EC Take 1 tab by mouth 30 minutes before breakfast and 30 minutes before dinner daily for 2 weeks (Patient taking differently: Take 1 tablet (40 mg total) by mouth every morning before breakfast. Take 1 tab by mouth 30 minutes before breakfast and 30 minutes before dinner daily for 2 weeks) 120 tablet 2 2/19/2024 at 0800    ERGOCALCIFEROL 1.25 MG (18902 UT) Oral Cap TAKE 1 CAPSULE BY MOUTH EVERY 7 DAYS 12 capsule 3 2/6/2024    Acetaminophen (TYLENOL ARTHRITIS EXT RELIEF OR) Take 2 tablets by mouth 2 (two) times daily.   2/20/2024    ofloxacin 0.3 % Ophthalmic Solution    post op    prednisoLONE 1 % Ophthalmic Suspension Apply 1 drop to eye 4 (four) times daily.   post op    OXCARBAZEPINE 150 MG Oral Tab TAKE 1 TABLET(150 MG) BY MOUTH TWICE  DAILY 180 tablet 1 More than a month    cycloSPORINE 0.05 % Ophthalmic Emulsion Place 1 drop into both eyes 2 (two) times daily. 180 each 5 post op    HYDROcodone-acetaminophen 5-325 MG Oral Tab Take 1 tablet by mouth every 6 (six) hours as needed for Pain.   More than a month       Social History:   reports that she quit smoking about 48 years ago. Her smoking use included cigarettes. She has never used smokeless tobacco. She reports that she does not currently use alcohol. She reports that she does not use drugs.    Family History:  Family History   Problem Relation Age of Onset    Diabetes Father     Diabetes Sister     Other (biliary sclerosis) Sister     Heart Disorder Brother     Cancer Brother 64        Blood CA    Diabetes Brother     Colon Cancer Brother 63    Melanoma Brother     Other (Other) Mother         bloody noses    Cancer Other     Breast Cancer Maternal Aunt 80        Dx breast CA age 80    Breast Cancer Paternal Aunt 58        Dx Breast CA age 58       Review of Systems:  Constitutional: No fevers, chills, fatigue or night sweats.  ENT: No mouth pain, neck pain, running nose, headaches or swollen glands.  Skin: No rashes, pruritus or skin changes,  Respiratory: Denies cough, wheezing or shortness of breath.  CV: Denies chest pain, palpitations, orthopnea, PND or dizziness.  Musculoskeletal: No joint pain, stiffness or swelling.  GI: No nausea, vomiting or diarrhea. No blood in stools.  Neurologic: No seizures, tremors, weakness or numbness.    Physical Exam:    /72 (BP Location: Left arm)   Pulse 78   Temp 97.1 °F (36.2 °C) (Temporal)   Resp 16   Ht 5' 1.5\" (1.562 m)   Wt 210 lb (95.3 kg)   SpO2 96%   BMI 39.04 kg/m²   General: NAD  Neck: No JVD  Lungs: CTA bilat  Heart: RRR, S1, S2  Abdomen: Soft, NT/ND, BS+x4  Extremities: Warm, dry, no LE edema bilat  Pulses: 2+ bilat DP  Skin: no rashes or legions noted  Neurological:  AAOx3, MAEW    Laboratory Data:       Imaging:  na    Impression:  No changes since the pcp H & P was done.  + lingering intermittent cough since pneumonia 12/23.  - cardiac and lung status stable  - cardiac and lung exam sounds normal  - okay to proceed with planned surgery      Bennett Banuelos MD  2/20/2024  10:59 AM

## 2024-02-20 NOTE — DISCHARGE INSTRUCTIONS
Home Instructions Following Cataract Surgery        Your eyes may be dilated in the office tomorrow so you may need a .    It is not uncommon to have blurry and/ or double vision.    Eye Kit:  Bring this kit to the office with you with all your eye drops in it for each visit.  Instructions for eye drops are on any attached form.  Put the lens information card with your medical information.    Discomfort After Surgery: It is not uncommon to experience some discomfort after your surgery,  such as:  Scratchy feeling in the eye-(almost like an eyelash in your eye). This may be from your incision and should heal with time.  Soreness and headache-If this happens, you may take aspirin, Tylenol, or whatever you  Commonly take for aches and pains    Lifestyles:  Continue your regular medications as recommended by your family doctor.  No swimming/hot tubs for two weeks.  Lifting up to 50lbs only  For the next 24 hours we advise:  1.   Do not drive a car, operate machinery or power tools.  2.   Do not drink alcoholic beverages.    Anesthetics:  The anesthetic we used around your eye will probably wear off in 2-10 hours. Your head may feel  numb on the side you had surgery. The anesthetic may also cause you to have some double vision  until it wears off completely.    DO NOT RUB YOUR EYE.    Post-op Eye Drops Instructions  You will need to use two different eye drops following your procedure.  However, if you have an eye patch on your eye these eye drops will not begin until the doctor removes your eye patch tomorrow.    Start the antibiotic and steroid eye drops after getting home from the hospital.  Use the eye drops 4 times a day.  If any additional eye drops were sent to your pharmacy, use them as ordered.  Wash your hands prior to using your eye drops.  Shake the bottle well.  Make sure the tip of the bottle does not touch your eye or eye lashes.  Wait 5 minutes between drops  Continue using  your regular medications including glaucoma medicine.  Wear your plastic eye shield for the first 3 nights.      In case of emergency: Call [phone]   Please do not hesitate to call if you have any concerns or  questions before being seen by your doctor.    If it is after hours, our answering service will put you in touch with one of our doctors.

## 2024-02-20 NOTE — OPERATIVE REPORT
OPERATIVE REPORT    PREOPERATIVE/POSTOPERATIVE DIAGNOSIS:COMBINED AGE RELATED CATARACT FORMATION, LEFT EYE     OPERATION PERFORMED:   PHACOEMULSIFICATION WITH POSTERIOR CHAMBER       INTRAOCULAR LENS, LEFT EYE    SURGEON:      SHEELA JAMESON M.D.    TYPE OF ANESTHESIA:   MONITORED ANESTHESIA CARE    COMPLICATIONS:   NONE    ESTIMATED BLOOD LOSS:   NONE    REASON FOR SURGERY:   DECREASED VISION, LEFT EYE    TECHNIQUE:  The patient was brought to the preoperative area.  The patient had a clear understanding of the cataract in the left eye, as well as the cataract removal procedure.  All questions were answered regarding the procedure and condition.  No guarantees were offered.  The risks of loss of eye, life, etc. were all understood.  All other risks, benefits, alternatives to cataract surgery were explained to and understood by the patient.  The patient was alert at the time of the discussion.  The patient had dilation and anesthetic eye drops started in the preoperative area.  The patient was then taken to the operating room, and the face was prepped and draped.  Topical anesthesia was continued.  A temporal clear corneal approach was used for the right eye.  A paracentesis was created.  Preservative-free shugarcaine was injected into the anterior chamber.  Viscoelastics were used to keep the anterior chamber formed and endothelium protected.  Next, the temporal corneal incision was created using a keratome blade.  A cystome was used to begin the capsulorrhexis.  It was completed using Utrata forceps.  Next, BSS from the syringe with a cannula tip was used to perform hydrodissection of the lens and it rotated freely.  The same instrument was also used to perform hydroelineation.  Next, the phacoemulsification unit hand piece was used with a second instrument to perform removal of the cataract.  All remaining cortical material as well as accessible lens epithelial cells underneath the anterior capsule were removed  with irrigation and aspiration using polish mode for the lens epithelial cells.  Next, the capsular bag was reinflated with Provisc.  A foldable acrylic lens, Tecnis, DC ZEPEDA, 22.5 dioptre, was injected into the capsular bag and allowed to unfold.  The lens remained well centered throughout the rest of the procedure.  BSS through a cannula was used to reform the chamber.  It was also used to perform stroma hydration of the temporal cornea, as well as the paracentesis incision sites. For patients without fluoroquinolone allergy, less than 0.1cc  intracameral vigamox was injected into the eye.  Intraocular pressure was normal.  If not allergic to it, betadine 5% eye drops were placed. The wounds showed no leakage.  The eyelid speculum, tapes and drapes were all removed.   The eyes were protected with sunglasses.  The patient went to the recovery area in stable condition, having tolerated the procedure well.

## 2024-02-20 NOTE — ANESTHESIA PREPROCEDURE EVALUATION
PRE-OP EVALUATION    Patient Name: Pia Guerra    Admit Diagnosis: COMBINED AGE RELATED CATARACT    Pre-op Diagnosis: COMBINED AGE RELATED CATARACT    PHACOEMULSIFICATION OF THE LEFT CATARACT WITH PLACEMENT OF INTRAOCULAR LENS IMPLANT    Anesthesia Procedure: PHACOEMULSIFICATION OF THE LEFT CATARACT WITH PLACEMENT OF INTRAOCULAR LENS IMPLANT (Left)    Surgeon(s) and Role:     * Bennett Banuelos MD - Primary    Pre-op vitals reviewed.  Temp: 97.1 °F (36.2 °C)  Pulse: 78  Resp: 16  BP: 130/72  SpO2: 96 %  Body mass index is 39.04 kg/m².    Current medications reviewed.  Hospital Medications:   [MAR Hold] acetaminophen (Tylenol Extra Strength) tab 1,000 mg  1,000 mg Oral Once    [MAR Hold] scopolamine (Transderm-Scop) 1 MG/3DAYS patch 1 patch  1 patch Transdermal Once    [MAR Hold] glucose (Dex4) 15 GM/59ML oral liquid 15 g  15 g Oral Q15 Min PRN    Or    [MAR Hold] glucose (Glutose) 40% oral gel 15 g  15 g Oral Q15 Min PRN    Or    [MAR Hold] glucose-vitamin C (Dex-4) chewable tab 4 tablet  4 tablet Oral Q15 Min PRN    Or    [MAR Hold] dextrose 50% injection 50 mL  50 mL Intravenous Q15 Min PRN    Or    [MAR Hold] glucose (Dex4) 15 GM/59ML oral liquid 30 g  30 g Oral Q15 Min PRN    Or    [MAR Hold] glucose (Glutose) 40% oral gel 30 g  30 g Oral Q15 Min PRN    Or    [MAR Hold] glucose-vitamin C (Dex-4) chewable tab 8 tablet  8 tablet Oral Q15 Min PRN    lactated ringers infusion   Intravenous Continuous    [COMPLETED] tetracaine (Pontocaine) 0.5 % ophthalmic solution 1 drop  1 drop Ophthalmic See Admin Instructions    [COMPLETED] tropicamide (Mydriacyl) 1 % ophthalmic solution 1 drop  1 drop Ophthalmic See Admin Instructions    [COMPLETED] cyclopentolate (Cyclogyl) 1 % ophthalmic solution 1 drop  1 drop Ophthalmic See Admin Instructions    [COMPLETED] phenylephrine (Mydfrin) 2.5 % ophthalmic solution 1 drop  1 drop Ophthalmic See Admin Instructions    EPINEPHrine (PF) - lidocaine (Adrenalin-Xylocaine) 1 mg - 4% in BSS  ophthalmic irrigation   Irrigation Once (Intra-Op)    [COMPLETED] tetracaine (Pontocaine) 0.5 % ophthalmic solution 1 drop  1 drop Ophthalmic See Admin Instructions    [COMPLETED] tropicamide (Mydriacyl) 1 % ophthalmic solution 1 drop  1 drop Ophthalmic See Admin Instructions    [COMPLETED] cyclopentolate (Cyclogyl) 1 % ophthalmic solution 1 drop  1 drop Ophthalmic See Admin Instructions    [COMPLETED] phenylephrine (Mydfrin) 2.5 % ophthalmic solution 1 drop  1 drop Ophthalmic See Admin Instructions    [COMPLETED] EPINEPHrine (PF) - lidocaine (Adrenalin-Xylocaine) 1 mg - 4% in BSS ophthalmic irrigation   Irrigation Once (Intra-Op)       Outpatient Medications:     Medications Prior to Admission   Medication Sig Dispense Refill Last Dose    diclofenac 1 % External Gel Apply topically 4 (four) times daily.   2/18/2024    Cyanocobalamin (VITAMIN B 12 OR) Take by mouth daily.   2/6/2024    DULoxetine 30 MG Oral Cap DR Particles Take 1 capsule (30 mg total) by mouth daily.   2/19/2024 at 2100    semaglutide 4 MG/3ML Subcutaneous Solution Pen-injector Inject 1 mg into the skin once a week.   2/13/2024    albuterol (5 MG/ML) 0.5% Inhalation Nebu Soln Take 0.5 mL (2.5 mg total) by nebulization as needed for Wheezing.   Past Month    ALBUTEROL SULFATE IN Inhale into the lungs as needed.   2/13/2024    benzonatate 100 MG Oral Cap Take 1 capsule (100 mg total) by mouth as needed for cough.   Past Month    PAROXETINE HCL 40 MG Oral Tab TAKE 1 TABLET(40 MG) BY MOUTH EVERY MORNING 90 tablet 1 not taking    rosuvastatin 10 MG Oral Tab Take 1 tablet (10 mg total) by mouth nightly. 90 tablet 0 2/19/2024 at 2100    pantoprazole 40 MG Oral Tab EC Take 1 tab by mouth 30 minutes before breakfast and 30 minutes before dinner daily for 2 weeks (Patient taking differently: Take 1 tablet (40 mg total) by mouth every morning before breakfast. Take 1 tab by mouth 30 minutes before breakfast and 30 minutes before dinner daily for 2 weeks) 120  tablet 2 2/19/2024 at 0800    ERGOCALCIFEROL 1.25 MG (82699 UT) Oral Cap TAKE 1 CAPSULE BY MOUTH EVERY 7 DAYS 12 capsule 3 2/6/2024    Acetaminophen (TYLENOL ARTHRITIS EXT RELIEF OR) Take 2 tablets by mouth 2 (two) times daily.   2/20/2024    ofloxacin 0.3 % Ophthalmic Solution    post op    prednisoLONE 1 % Ophthalmic Suspension Apply 1 drop to eye 4 (four) times daily.   post op    OXCARBAZEPINE 150 MG Oral Tab TAKE 1 TABLET(150 MG) BY MOUTH TWICE DAILY 180 tablet 1 More than a month    cycloSPORINE 0.05 % Ophthalmic Emulsion Place 1 drop into both eyes 2 (two) times daily. 180 each 5 post op    HYDROcodone-acetaminophen 5-325 MG Oral Tab Take 1 tablet by mouth every 6 (six) hours as needed for Pain.   More than a month       Allergies: Cephalexin, Morphine and related [opioid analgesics], Naproxen, Oxycodone, Aspirin, Lactose, and Zithromax [azithromycin]      Anesthesia Evaluation    Patient summary reviewed.    Anesthetic Complications           GI/Hepatic/Renal      (+) GERD                           Cardiovascular                (+) obesity                                       Endo/Other      (+) diabetes                            Pulmonary      (+) asthma              (+) sleep apnea       Neuro/Psych      (+) depression           (+) neuromuscular disease                     Past Surgical History:   Procedure Laterality Date    COLONOSCOPY N/A 04/17/2019    Procedure: COLONOSCOPY;  Surgeon: Tanna Ulrich MD;  Location: [facility] ENDOSCOPY    D & C  05/24/2018    Diagnostic hysteroscopy, D&C, Endometrial biopsy    EXCISIONAL BIOPSY LEFT  05/2020    Efrain- EXC BX -radial scar    HYSTERECTOMY  2019    BSO    NEEDLE BIOPSY LEFT  04/2020     US BX- radial scar with usual ductal hyperplasia    OTHER SURGICAL HISTORY      C4 fusion, piece of bone removed from left hip    OTHER SURGICAL HISTORY      ID of abscesses     Social History     Socioeconomic History    Marital status: Single   Tobacco Use    Smoking  status: Former     Types: Cigarettes     Quit date: 1976     Years since quittin.0    Smokeless tobacco: Never   Vaping Use    Vaping Use: Never used   Substance and Sexual Activity    Alcohol use: Not Currently     Alcohol/week: 0.0 standard drinks of alcohol    Drug use: No    Sexual activity: Not Currently     History   Drug Use No     Available pre-op labs reviewed.               Airway      Mallampati: I  Mouth opening: >3 FB  TM distance: > 6 cm  Neck ROM: full Cardiovascular    Cardiovascular exam normal.         Dental    Dentition appears grossly intact         Pulmonary                     Other findings              ASA: 3   Plan: MAC  NPO status verified and           Plan/risks discussed with: patient                Present on Admission:  **None**

## (undated) DEVICE — SOLUTION IRRIG 1000ML ST H2O AQUALITE PLAS

## (undated) DEVICE — OCCLUDER EYE POLYETH HYPOALRG ADH TP

## (undated) DEVICE — 35 ML SYRINGE REGULAR TIP: Brand: MONOJECT

## (undated) DEVICE — Device: Brand: JELCO

## (undated) DEVICE — Device

## (undated) DEVICE — CENTURION FMS PACK 2200

## (undated) DEVICE — CANNULA IRRIG 30GA L7/8IN END 4MM 45DEG ANT

## (undated) DEVICE — LAWSON - GOWN SURG STD XL STL DISP

## (undated) DEVICE — MEDI-VAC NON-CONDUCTIVE SUCTION TUBING 6MM X 1.8M (6FT.) L: Brand: CARDINAL HEALTH

## (undated) DEVICE — CONMED SCOPE SAVER BITE BLOCK, 20X27 MM: Brand: SCOPE SAVER

## (undated) DEVICE — Device: Brand: CUSTOM PROCEDURE KIT

## (undated) DEVICE — FORCEP RADIAL JAW 4

## (undated) DEVICE — STERILE POLYISOPRENE POWDER-FREE SURGICAL GLOVES: Brand: PROTEXIS

## (undated) DEVICE — SOLUTION PREP 30ML 5% POVIDONE IOD EYE BDINE

## (undated) DEVICE — SOLUTION IRRIG 500ML BAL SALT FLX BG BSS

## (undated) DEVICE — CATARACT PATIENT CARE KIT

## (undated) DEVICE — LAWSON - KIT MINOR BASIN GSA

## (undated) DEVICE — PACK EYE CUSTOM

## (undated) DEVICE — CLEARCUT® SLIT KNIFE INTREPID MICRO-COAXIAL SYSTEM 2.4 SB: Brand: CLEARCUT®; INTREPID

## (undated) DEVICE — CANNULA IRRIG 30GA L7/8IN 45DEG ANT CHMBR ANG

## (undated) DEVICE — CLEARCUT® SIDEPORT KNIFE DUAL BEVEL 1.0MM ANGLED: Brand: CLEARCUT®

## (undated) DEVICE — 3 ML SYRINGE LUER-LOCK TIP: Brand: MONOJECT

## (undated) DEVICE — LINE MNTR ADLT SET O2 INTMD

## (undated) DEVICE — HANDPIECE IRRIG 45DEG ASPIR SIL CAP GRD

## (undated) DEVICE — NEEDLE CYSTOTOME W/25G CANNULA

## (undated) DEVICE — EYE PACK: Brand: MEDLINE INDUSTRIES, INC.

## (undated) DEVICE — BANDAGE,GAUZE,BULKEE II,4.5"X4.1YD,STRL: Brand: MEDLINE

## (undated) DEVICE — Device: Brand: DEFENDO AIR/WATER/SUCTION AND BIOPSY VALVE

## (undated) DEVICE — SINGLE USE MEDICAL DEVICE FOR OPHTHALMIC SURGERY: Brand: SIL. COATED I/A 45 MIL 12/B

## (undated) DEVICE — GLOVE SURGICAL 7.5 SENSICARE P

## (undated) DEVICE — CANNULA IRRIG 25GA X 7/8IN ANT CHMBR DOME X

## (undated) DEVICE — BANDAGE GZ W4.5INXL4.1YD 6 PLY WHT COT OPN

## (undated) NOTE — LETTER
OUTSIDE TESTING RESULT REQUEST     IMPORTANT: FOR YOUR IMMEDIATE ATTENTION  Please FAX all test results listed below to: [phone]     Testing already done on or about: 2024     * * * * If testing is NOT complete, arrange with patient A.S.A.P. * * * *      Patient Name: Pia Guerra  Surgery Date: 2024  Medical Record: [id number]  CSN: [id number]  : 1960 - A: 63 y     Sex: female  Surgeon(s):  Bennett Banuelos MD  Procedure: PHACOEMULSIFICATION OF THE RIGHT CATARACT WITH PLACEMENT OF INTRAOCULAR LENS IMPLANT  Anesthesia Type: MAC     Surgeon: Bennett Banuelos MD     The following Testing and Time Line are REQUIRED PER ANESTHESIA     EKG READ AND SIGNED WITHIN   90 days      Thank You,   Sent by: Shelley GALVEZ